# Patient Record
Sex: MALE | Race: WHITE | NOT HISPANIC OR LATINO | URBAN - METROPOLITAN AREA
[De-identification: names, ages, dates, MRNs, and addresses within clinical notes are randomized per-mention and may not be internally consistent; named-entity substitution may affect disease eponyms.]

---

## 2020-06-23 ENCOUNTER — OFFICE VISIT (OUTPATIENT)
Dept: CARDIOTHORACIC SURGERY | Facility: CLINIC | Age: 55
End: 2020-06-23
Payer: COMMERCIAL

## 2020-06-23 VITALS
DIASTOLIC BLOOD PRESSURE: 86 MMHG | WEIGHT: 200.5 LBS | HEART RATE: 63 BPM | OXYGEN SATURATION: 99 % | SYSTOLIC BLOOD PRESSURE: 136 MMHG | BODY MASS INDEX: 32.22 KG/M2 | HEIGHT: 66 IN

## 2020-06-23 DIAGNOSIS — I34.0 MITRAL VALVE INSUFFICIENCY, UNSPECIFIED ETIOLOGY: Primary | ICD-10-CM

## 2020-06-23 DIAGNOSIS — I34.0 NONRHEUMATIC MITRAL VALVE REGURGITATION: ICD-10-CM

## 2020-06-23 PROCEDURE — 99205 OFFICE O/P NEW HI 60 MIN: CPT | Mod: S$GLB,,, | Performed by: THORACIC SURGERY (CARDIOTHORACIC VASCULAR SURGERY)

## 2020-06-23 PROCEDURE — 99999 PR PBB SHADOW E&M-EST. PATIENT-LVL III: ICD-10-PCS | Mod: PBBFAC,,, | Performed by: THORACIC SURGERY (CARDIOTHORACIC VASCULAR SURGERY)

## 2020-06-23 PROCEDURE — 3008F PR BODY MASS INDEX (BMI) DOCUMENTED: ICD-10-PCS | Mod: CPTII,S$GLB,, | Performed by: THORACIC SURGERY (CARDIOTHORACIC VASCULAR SURGERY)

## 2020-06-23 PROCEDURE — 99205 PR OFFICE/OUTPT VISIT, NEW, LEVL V, 60-74 MIN: ICD-10-PCS | Mod: S$GLB,,, | Performed by: THORACIC SURGERY (CARDIOTHORACIC VASCULAR SURGERY)

## 2020-06-23 PROCEDURE — 3008F BODY MASS INDEX DOCD: CPT | Mod: CPTII,S$GLB,, | Performed by: THORACIC SURGERY (CARDIOTHORACIC VASCULAR SURGERY)

## 2020-06-23 PROCEDURE — 99999 PR PBB SHADOW E&M-EST. PATIENT-LVL III: CPT | Mod: PBBFAC,,, | Performed by: THORACIC SURGERY (CARDIOTHORACIC VASCULAR SURGERY)

## 2020-06-23 RX ORDER — METOPROLOL TARTRATE 25 MG/1
25 TABLET, FILM COATED ORAL 2 TIMES DAILY
Status: ON HOLD | COMMUNITY
Start: 2020-04-15 | End: 2020-07-28 | Stop reason: HOSPADM

## 2020-06-23 RX ORDER — LOSARTAN POTASSIUM 100 MG/1
TABLET ORAL
Status: ON HOLD | COMMUNITY
Start: 2020-06-20 | End: 2020-07-28 | Stop reason: HOSPADM

## 2020-06-23 NOTE — PROGRESS NOTES
Subjective:      Patient ID: Kamari Merino is a 55 y.o. male.    Chief Complaint: Consult      HPI:  Kamari Merino is a 55 y.o. male with a medical history of hypertension who presents to the CTS clinic for evaluation of severe mitral regurgitation.  Patient reports that the PCP was having problems regulating his blood pressure on medication and sent him to see a cardiologist who diagnosised him with MR on echo.  Clinical symptoms include fatigue, shortness of breath, and palpations.       Family and social history reviewed    Review of patient's allergies indicates:  No Known Allergies  Past Medical History:   Diagnosis Date    Hypertension      History reviewed. No pertinent surgical history.  Family History     None        Social History     Socioeconomic History    Marital status:      Spouse name: Not on file    Number of children: Not on file    Years of education: Not on file    Highest education level: Not on file   Occupational History    Not on file   Social Needs    Financial resource strain: Not on file    Food insecurity     Worry: Not on file     Inability: Not on file    Transportation needs     Medical: Not on file     Non-medical: Not on file   Tobacco Use    Smoking status: Never Smoker   Substance and Sexual Activity    Alcohol use: Not on file    Drug use: Not on file    Sexual activity: Not on file   Lifestyle    Physical activity     Days per week: Not on file     Minutes per session: Not on file    Stress: Not on file   Relationships    Social connections     Talks on phone: Not on file     Gets together: Not on file     Attends [employer] service: Not on file     Active member of club or organization: Not on file     Attends meetings of clubs or organizations: Not on file     Relationship status: Not on file   Other Topics Concern    Not on file   Social History Narrative    Not on file       Current medications Reviewed    Review of Systems   Constitutional: Positive for  appetite change and fatigue. Negative for activity change and fever.   HENT: Negative for nosebleeds.    Respiratory: Positive for shortness of breath. Negative for cough.    Cardiovascular: Negative for chest pain, palpitations and leg swelling.        Palpitations    Gastrointestinal: Negative for abdominal distention, abdominal pain and nausea.   Genitourinary: Negative for frequency.   Musculoskeletal: Negative for arthralgias and myalgias.   Skin: Negative for rash.   Neurological: Negative for dizziness and numbness.   Hematological: Does not bruise/bleed easily.     Objective:   Physical Exam  Constitutional:       Appearance: He is well-developed.   HENT:      Head: Normocephalic and atraumatic.   Eyes:      Extraocular Movements: Extraocular movements intact.   Neck:      Musculoskeletal: Normal range of motion.   Cardiovascular:      Rate and Rhythm: Normal rate and regular rhythm.      Heart sounds: Normal heart sounds.   Pulmonary:      Effort: Pulmonary effort is normal.      Breath sounds: Normal breath sounds.   Abdominal:      Palpations: Abdomen is soft.   Musculoskeletal: Normal range of motion.   Skin:     General: Skin is warm and dry.      Capillary Refill: Capillary refill takes less than 2 seconds.   Neurological:      Mental Status: He is alert and oriented to person, place, and time.         Diagnostic Results:   ECHO 5/22/2020  Normal left ventricular size and function  Myxomatous degeneration of mitral valve  Severe prolapse of both mitral leaflets of the mitral valve  Severe mitral regurgitation   4 distinct jets of MR detected  Normal left atrium  No evidence of patient foramen ovale by bubble study    All diagnotics and labs reviewed.    Assessment:   1. Severe mitral regurgitation  Plan:     CTS Attending Note:    I have personally taken the history and examined this patient and agree with the FAISAL's note as stated above.  Very pleasant 55-year-old gentleman who initially presented with  fatigue and decreased stamina.  He had a thoughtful and thorough evaluation which included echocardiography.  This study demonstrated severe mitral regurgitation.  I discussed his situation with him and his wife in detail, and I recommended mitral valve repair.  We discussed the risks and benefits of the proposed procedure, including but not limited to death, stroke, respiratory complications, renal complications, arrythmia, need for permanent pacemaker, and infection. His questions have been answered, and he wishes to proceed.  We discussed the advantages and disadvantages of tissue and mechanical prostheses should repair not be feasible.  In that instance, he indicated that he desires a mechanical prosthesis.  Lastly, I recommended preoperative coronary angiography.  He will contact Dr. Osuna's office to see if this can be done in [place].  They may be closed for out patients due to COVID.  If that is the case, then we will make arrangements for him to have an angiogram here.

## 2020-06-23 NOTE — LETTER
[facility]  [address]  [facility] [address]  Phone: [phone] June 23, 2020      Leonidas Osuna MD  [address] [facility] [place] [address]    Patient: Kamari Merino   MR Number: [id number]   YOB: 1965   Date of Visit: 6/23/2020     Dear Dr. Osuna,     I had the pleasure seeing your patient Mr. Lg Merino in clinic today.  As you know, he is a very pleasant 55-year-old gentleman who initially presented with fatigue.  He underwent a thoughtful and thorough evaluation which included echocardiography.  The study demonstrated severe mitral regurgitation.  I discussed Mr. Merino's situation with him and his wife in detail.  I recommended mitral valve repair, and he agreed with this.  We will plan to get this done for him sometime in the near future.  As part of his preoperative preparation, I recommended coronary angiography.  They are going to reach out to your office to make those arrangements.  Thank you again for sending this pleasant gentleman to me.  When he does come to surgery, I will certainly keep you appraised of his progress.    Sincerely,        Cornel Guzman MD  Chief, Division of Thoracic & Cardiovascular Surgery  [facility]    PEP/afw

## 2020-06-25 ENCOUNTER — PATIENT MESSAGE (OUTPATIENT)
Dept: CARDIOTHORACIC SURGERY | Facility: CLINIC | Age: 55
End: 2020-06-25

## 2020-07-21 ENCOUNTER — LAB VISIT (OUTPATIENT)
Dept: FAMILY MEDICINE | Facility: CLINIC | Age: 55
End: 2020-07-21
Payer: COMMERCIAL

## 2020-07-21 PROCEDURE — U0003 INFECTIOUS AGENT DETECTION BY NUCLEIC ACID (DNA OR RNA); SEVERE ACUTE RESPIRATORY SYNDROME CORONAVIRUS 2 (SARS-COV-2) (CORONAVIRUS DISEASE [COVID-19]), AMPLIFIED PROBE TECHNIQUE, MAKING USE OF HIGH THROUGHPUT TECHNOLOGIES AS DESCRIBED BY CMS-2020-01-R: HCPCS

## 2020-07-22 LAB — SARS-COV-2 RNA RESP QL NAA+PROBE: NOT DETECTED

## 2020-07-23 ENCOUNTER — HOSPITAL ENCOUNTER (OUTPATIENT)
Dept: RADIOLOGY | Facility: HOSPITAL | Age: 55
Discharge: HOME | DRG: 221 | End: 2020-07-23
Attending: THORACIC SURGERY (CARDIOTHORACIC VASCULAR SURGERY)
Payer: COMMERCIAL

## 2020-07-23 ENCOUNTER — HOSPITAL ENCOUNTER (OUTPATIENT)
Dept: PREADMISSION TESTING | Facility: HOSPITAL | Age: 55
Discharge: HOME | DRG: 221 | End: 2020-07-23
Attending: ANESTHESIOLOGY
Payer: COMMERCIAL

## 2020-07-23 ENCOUNTER — HOSPITAL ENCOUNTER (OUTPATIENT)
Dept: PULMONOLOGY | Facility: CLINIC | Age: 55
Discharge: HOME | DRG: 221 | End: 2020-07-23
Payer: COMMERCIAL

## 2020-07-23 ENCOUNTER — ANESTHESIA EVENT (OUTPATIENT)
Dept: SURGERY | Facility: HOSPITAL | Age: 55
DRG: 221 | End: 2020-07-23
Payer: COMMERCIAL

## 2020-07-23 ENCOUNTER — HOSPITAL ENCOUNTER (OUTPATIENT)
Dept: VASCULAR SURGERY | Facility: CLINIC | Age: 55
Discharge: HOME | DRG: 221 | End: 2020-07-23
Attending: THORACIC SURGERY (CARDIOTHORACIC VASCULAR SURGERY)
Payer: COMMERCIAL

## 2020-07-23 ENCOUNTER — OFFICE VISIT (OUTPATIENT)
Dept: CARDIOTHORACIC SURGERY | Facility: CLINIC | Age: 55
DRG: 221 | End: 2020-07-23
Payer: COMMERCIAL

## 2020-07-23 ENCOUNTER — HOSPITAL ENCOUNTER (OUTPATIENT)
Dept: CARDIOLOGY | Facility: CLINIC | Age: 55
Discharge: HOME | DRG: 221 | End: 2020-07-23
Payer: COMMERCIAL

## 2020-07-23 VITALS
HEART RATE: 71 BPM | BODY MASS INDEX: 32.14 KG/M2 | HEIGHT: 66 IN | SYSTOLIC BLOOD PRESSURE: 128 MMHG | RESPIRATION RATE: 16 BRPM | WEIGHT: 200 LBS | OXYGEN SATURATION: 98 % | DIASTOLIC BLOOD PRESSURE: 80 MMHG | TEMPERATURE: 98 F

## 2020-07-23 VITALS
BODY MASS INDEX: 32.08 KG/M2 | OXYGEN SATURATION: 100 % | TEMPERATURE: 99 F | HEART RATE: 64 BPM | WEIGHT: 199.63 LBS | HEIGHT: 66 IN | SYSTOLIC BLOOD PRESSURE: 129 MMHG | DIASTOLIC BLOOD PRESSURE: 88 MMHG

## 2020-07-23 DIAGNOSIS — I34.0 MITRAL VALVE INSUFFICIENCY, UNSPECIFIED ETIOLOGY: ICD-10-CM

## 2020-07-23 DIAGNOSIS — I34.0 MITRAL REGURGITATION: ICD-10-CM

## 2020-07-23 DIAGNOSIS — I34.0 MITRAL VALVE INSUFFICIENCY, UNSPECIFIED ETIOLOGY: Primary | ICD-10-CM

## 2020-07-23 DIAGNOSIS — Z01.818 PRE-OP EXAM: ICD-10-CM

## 2020-07-23 LAB
DLCO ADJ PRE: 26.62 ML/(MIN*MMHG) (ref 20.16–34.01)
DLCO SINGLE BREATH LLN: 20.16
DLCO SINGLE BREATH PRE REF: 98.3 %
DLCO SINGLE BREATH REF: 27.09
DLCOC SBVA LLN: 2.93
DLCOC SBVA PRE REF: 95.5 %
DLCOC SBVA REF: 4.27
DLCOC SINGLE BREATH LLN: 20.16
DLCOC SINGLE BREATH PRE REF: 98.3 %
DLCOC SINGLE BREATH REF: 27.09
DLCOCSBVAULN: 5.61
DLCOCSINGLEBREATHULN: 34.01
DLCOSINGLEBREATHULN: 34.01
DLCOVA LLN: 2.93
DLCOVA PRE REF: 95.5 %
DLCOVA PRE: 4.08 ML/(MIN*MMHG*L) (ref 2.93–5.61)
DLCOVA REF: 4.27
DLCOVAULN: 5.61
DLVAADJ PRE: 4.08 ML/(MIN*MMHG*L) (ref 2.93–5.61)
FEF 25 75 LLN: 1.58
FEF 25 75 PRE REF: 123 %
FEF 25 75 REF: 3.03
FEV05 LLN: 1.42
FEV05 REF: 2.55
FEV1 FVC LLN: 68
FEV1 FVC PRE REF: 103 %
FEV1 FVC REF: 79
FEV1 LLN: 2.57
FEV1 PRE REF: 114.7 %
FEV1 REF: 3.34
FVC LLN: 3.27
FVC PRE REF: 111.3 %
FVC REF: 4.23
IVC PRE: 4.79 L (ref 3.27–5.19)
IVC SINGLE BREATH LLN: 3.27
IVC SINGLE BREATH PRE REF: 113.2 %
IVC SINGLE BREATH REF: 4.23
IVCSINGLEBREATHULN: 5.19
MVV LLN: 108
MVV PRE REF: 98.6 %
MVV REF: 127
PEF LLN: 6.7
PEF PRE REF: 127.5 %
PEF REF: 8.77
PHYSICIAN COMMENT: ABNORMAL
PRE DLCO: 26.62 ML/(MIN*MMHG) (ref 20.16–34.01)
PRE FEF 25 75: 3.73 L/S (ref 1.58–4.48)
PRE FET 100: 6.63 SEC
PRE FEV05 REF: 119.3 %
PRE FEV1 FVC: 81.5 % (ref 67.68–90.55)
PRE FEV1: 3.84 L (ref 2.57–4.11)
PRE FEV5: 3.04 L (ref 1.42–3.69)
PRE FVC: 4.71 L (ref 3.27–5.19)
PRE MVV: 125 L/MIN (ref 107.71–145.73)
PRE PEF: 11.18 L/S (ref 6.7–10.84)
VA PRE: 6.53 L (ref 6.19–6.19)
VA SINGLE BREATH LLN: 6.19
VA SINGLE BREATH PRE REF: 105.4 %
VA SINGLE BREATH REF: 6.19
VASINGLEBREATHULN: 6.19

## 2020-07-23 PROCEDURE — 93880 PR DUPLEX SCAN EXTRACRANIAL,BILAT: ICD-10-PCS | Mod: S$GLB,,, | Performed by: SURGERY

## 2020-07-23 PROCEDURE — 94010 BREATHING CAPACITY TEST: ICD-10-PCS | Mod: S$GLB,,, | Performed by: INTERNAL MEDICINE

## 2020-07-23 PROCEDURE — 99999 PR PBB SHADOW E&M-EST. PATIENT-LVL IV: CPT | Mod: PBBFAC,,, | Performed by: THORACIC SURGERY (CARDIOTHORACIC VASCULAR SURGERY)

## 2020-07-23 PROCEDURE — 93010 EKG 12-LEAD: ICD-10-PCS | Mod: S$GLB,,, | Performed by: INTERNAL MEDICINE

## 2020-07-23 PROCEDURE — 99499 NO LOS: ICD-10-PCS | Mod: S$GLB,,, | Performed by: THORACIC SURGERY (CARDIOTHORACIC VASCULAR SURGERY)

## 2020-07-23 PROCEDURE — 99999 PR PBB SHADOW E&M-EST. PATIENT-LVL IV: ICD-10-PCS | Mod: PBBFAC,,, | Performed by: THORACIC SURGERY (CARDIOTHORACIC VASCULAR SURGERY)

## 2020-07-23 PROCEDURE — 94010 BREATHING CAPACITY TEST: CPT | Mod: S$GLB,,, | Performed by: INTERNAL MEDICINE

## 2020-07-23 PROCEDURE — 94729 PR C02/MEMBANE DIFFUSE CAPACITY: ICD-10-PCS | Mod: S$GLB,,, | Performed by: INTERNAL MEDICINE

## 2020-07-23 PROCEDURE — 94727 GAS DIL/WSHOT DETER LNG VOL: CPT | Mod: S$GLB,,, | Performed by: INTERNAL MEDICINE

## 2020-07-23 PROCEDURE — 99499 UNLISTED E&M SERVICE: CPT | Mod: S$GLB,,, | Performed by: THORACIC SURGERY (CARDIOTHORACIC VASCULAR SURGERY)

## 2020-07-23 PROCEDURE — 71046 X-RAY EXAM CHEST 2 VIEWS: CPT | Mod: TC,FY

## 2020-07-23 PROCEDURE — 93880 EXTRACRANIAL BILAT STUDY: CPT | Mod: S$GLB,,, | Performed by: SURGERY

## 2020-07-23 PROCEDURE — 94727 PR PULM FUNCTION TEST BY GAS: ICD-10-PCS | Mod: S$GLB,,, | Performed by: INTERNAL MEDICINE

## 2020-07-23 PROCEDURE — 93005 ELECTROCARDIOGRAM TRACING: CPT | Mod: S$GLB,,, | Performed by: THORACIC SURGERY (CARDIOTHORACIC VASCULAR SURGERY)

## 2020-07-23 PROCEDURE — 93005 EKG 12-LEAD: ICD-10-PCS | Mod: S$GLB,,, | Performed by: THORACIC SURGERY (CARDIOTHORACIC VASCULAR SURGERY)

## 2020-07-23 PROCEDURE — 71046 X-RAY EXAM CHEST 2 VIEWS: CPT | Mod: 26,,, | Performed by: RADIOLOGY

## 2020-07-23 PROCEDURE — 94729 DIFFUSING CAPACITY: CPT | Mod: S$GLB,,, | Performed by: INTERNAL MEDICINE

## 2020-07-23 PROCEDURE — 93010 ELECTROCARDIOGRAM REPORT: CPT | Mod: S$GLB,,, | Performed by: INTERNAL MEDICINE

## 2020-07-23 PROCEDURE — 71046 XR CHEST PA AND LATERAL PRE-OP: ICD-10-PCS | Mod: 26,,, | Performed by: RADIOLOGY

## 2020-07-23 RX ORDER — PROPOFOL 10 MG/ML
5 INJECTION, EMULSION INTRAVENOUS CONTINUOUS
Status: CANCELLED | OUTPATIENT
Start: 2020-07-23

## 2020-07-23 RX ORDER — MUPIROCIN 20 MG/G
1 OINTMENT TOPICAL 2 TIMES DAILY
Status: CANCELLED | OUTPATIENT
Start: 2020-07-23 | End: 2020-07-28

## 2020-07-23 RX ORDER — POLYETHYLENE GLYCOL 3350 17 G/17G
17 POWDER, FOR SOLUTION ORAL DAILY
Status: CANCELLED | OUTPATIENT
Start: 2020-07-24

## 2020-07-23 RX ORDER — POTASSIUM CHLORIDE 29.8 MG/ML
40 INJECTION INTRAVENOUS
Status: CANCELLED | OUTPATIENT
Start: 2020-07-23

## 2020-07-23 RX ORDER — IPRATROPIUM BROMIDE AND ALBUTEROL SULFATE 2.5; .5 MG/3ML; MG/3ML
3 SOLUTION RESPIRATORY (INHALATION) EVERY 4 HOURS
Status: CANCELLED | OUTPATIENT
Start: 2020-07-23 | End: 2020-07-24

## 2020-07-23 RX ORDER — IPRATROPIUM BROMIDE AND ALBUTEROL SULFATE 2.5; .5 MG/3ML; MG/3ML
3 SOLUTION RESPIRATORY (INHALATION) EVERY 4 HOURS PRN
Status: CANCELLED | OUTPATIENT
Start: 2020-07-23 | End: 2020-07-24

## 2020-07-23 RX ORDER — DOCUSATE SODIUM 100 MG/1
100 CAPSULE, LIQUID FILLED ORAL 2 TIMES DAILY
Status: CANCELLED | OUTPATIENT
Start: 2020-07-23

## 2020-07-23 RX ORDER — ASPIRIN 325 MG
325 TABLET, DELAYED RELEASE (ENTERIC COATED) ORAL DAILY
Status: CANCELLED | OUTPATIENT
Start: 2020-07-24

## 2020-07-23 RX ORDER — PANTOPRAZOLE SODIUM 40 MG/1
40 TABLET, DELAYED RELEASE ORAL
Status: CANCELLED | OUTPATIENT
Start: 2020-07-24

## 2020-07-23 RX ORDER — ASPIRIN 325 MG
325 TABLET ORAL ONCE
Status: CANCELLED | OUTPATIENT
Start: 2020-07-23 | End: 2020-07-23

## 2020-07-23 RX ORDER — ASPIRIN 325 MG
325 TABLET ORAL DAILY
Status: CANCELLED | OUTPATIENT
Start: 2020-07-24

## 2020-07-23 RX ORDER — POTASSIUM CHLORIDE 750 MG/1
20 TABLET, EXTENDED RELEASE ORAL EVERY 12 HOURS
Status: CANCELLED | OUTPATIENT
Start: 2020-07-23

## 2020-07-23 RX ORDER — LIDOCAINE HYDROCHLORIDE 10 MG/ML
1 INJECTION, SOLUTION EPIDURAL; INFILTRATION; INTRACAUDAL; PERINEURAL
Status: CANCELLED | OUTPATIENT
Start: 2020-07-23

## 2020-07-23 RX ORDER — METOPROLOL TARTRATE 25 MG/1
25 TABLET ORAL
Status: CANCELLED | OUTPATIENT
Start: 2020-07-23

## 2020-07-23 RX ORDER — FENTANYL CITRATE 50 UG/ML
25 INJECTION, SOLUTION INTRAMUSCULAR; INTRAVENOUS
Status: CANCELLED | OUTPATIENT
Start: 2020-07-23

## 2020-07-23 RX ORDER — SODIUM CHLORIDE 9 MG/ML
INJECTION, SOLUTION INTRAVENOUS CONTINUOUS
Status: CANCELLED | OUTPATIENT
Start: 2020-07-23

## 2020-07-23 RX ORDER — ASPIRIN 300 MG/1
300 SUPPOSITORY RECTAL ONCE AS NEEDED
Status: CANCELLED | OUTPATIENT
Start: 2020-07-23 | End: 2031-12-20

## 2020-07-23 RX ORDER — DEXTROSE MONOHYDRATE, SODIUM CHLORIDE, AND POTASSIUM CHLORIDE 50; 1.49; 4.5 G/1000ML; G/1000ML; G/1000ML
INJECTION, SOLUTION INTRAVENOUS CONTINUOUS
Status: CANCELLED | OUTPATIENT
Start: 2020-07-23

## 2020-07-23 RX ORDER — FENTANYL CITRATE 50 UG/ML
50 INJECTION, SOLUTION INTRAMUSCULAR; INTRAVENOUS
Status: CANCELLED | OUTPATIENT
Start: 2020-07-25

## 2020-07-23 RX ORDER — ALBUMIN HUMAN 50 G/1000ML
500 SOLUTION INTRAVENOUS ONCE AS NEEDED
Status: CANCELLED | OUTPATIENT
Start: 2020-07-23 | End: 2031-12-20

## 2020-07-23 RX ORDER — POTASSIUM CHLORIDE 14.9 MG/ML
20 INJECTION INTRAVENOUS
Status: CANCELLED | OUTPATIENT
Start: 2020-07-23

## 2020-07-23 RX ORDER — FENTANYL CITRATE 50 UG/ML
25 INJECTION, SOLUTION INTRAMUSCULAR; INTRAVENOUS
Status: CANCELLED | OUTPATIENT
Start: 2020-07-23 | End: 2020-07-24

## 2020-07-23 RX ORDER — MUPIROCIN 20 MG/G
1 OINTMENT TOPICAL
Status: CANCELLED | OUTPATIENT
Start: 2020-07-23

## 2020-07-23 RX ORDER — PANTOPRAZOLE SODIUM 40 MG/10ML
40 INJECTION, POWDER, LYOPHILIZED, FOR SOLUTION INTRAVENOUS DAILY
Status: CANCELLED | OUTPATIENT
Start: 2020-07-24

## 2020-07-23 RX ORDER — ATORVASTATIN CALCIUM 10 MG/1
40 TABLET, FILM COATED ORAL NIGHTLY
Status: CANCELLED | OUTPATIENT
Start: 2020-07-23

## 2020-07-23 RX ORDER — OXYCODONE HYDROCHLORIDE 5 MG/1
10 TABLET ORAL EVERY 4 HOURS PRN
Status: CANCELLED | OUTPATIENT
Start: 2020-07-23

## 2020-07-23 RX ORDER — ONDANSETRON 2 MG/ML
4 INJECTION INTRAMUSCULAR; INTRAVENOUS EVERY 12 HOURS PRN
Status: CANCELLED | OUTPATIENT
Start: 2020-07-23

## 2020-07-23 RX ORDER — OXYCODONE HYDROCHLORIDE 5 MG/1
5 TABLET ORAL EVERY 4 HOURS PRN
Status: CANCELLED | OUTPATIENT
Start: 2020-07-23

## 2020-07-23 RX ORDER — POTASSIUM CHLORIDE 14.9 MG/ML
60 INJECTION INTRAVENOUS
Status: CANCELLED | OUTPATIENT
Start: 2020-07-23

## 2020-07-23 RX ORDER — ACETAMINOPHEN 325 MG/1
650 TABLET ORAL EVERY 4 HOURS PRN
Status: CANCELLED | OUTPATIENT
Start: 2020-07-23

## 2020-07-23 RX ORDER — SODIUM CHLORIDE 0.9 % (FLUSH) 0.9 %
10 SYRINGE (ML) INJECTION
Status: CANCELLED | OUTPATIENT
Start: 2020-07-23

## 2020-07-23 RX ORDER — BISACODYL 10 MG
10 SUPPOSITORY, RECTAL RECTAL EVERY 12 HOURS PRN
Status: CANCELLED | OUTPATIENT
Start: 2020-07-23

## 2020-07-23 RX ORDER — METOCLOPRAMIDE HYDROCHLORIDE 5 MG/ML
5 INJECTION INTRAMUSCULAR; INTRAVENOUS EVERY 6 HOURS PRN
Status: CANCELLED | OUTPATIENT
Start: 2020-07-23

## 2020-07-23 NOTE — H&P (VIEW-ONLY)
Subjective:      Patient ID: Kamari Merino is a 55 y.o. male.    Chief Complaint: Pre-op Exam      HPI:  Kamari Merino is a 55 y.o. male with a medical history of hypertension who presents to the CTS clinic for evaluation of severe mitral regurgitation.  Patient reports that the PCP was having problems regulating his blood pressure on medication and sent him to see a cardiologist who diagnosised him with MR on echo.  Clinical symptoms include fatigue, shortness of breath, and palpations. The patient presents to [facility] today to pre-op for       Current Outpatient Medications:     metoprolol tartrate (LOPRESSOR) 25 MG tablet, 25 mg 2 (two) times daily. , Disp: , Rfl:     aspirin-calcium carbonate 81 mg-300 mg calcium(777 mg) Tab,  = 1 tab, Oral, Daily, # 30 tab, 0 Refill(s), Disp: , Rfl:     losartan (COZAAR) 100 MG tablet, , Disp: , Rfl:     multivit with minerals/lutein (MULTIVITAMIN 50 PLUS ORAL),  See Instructions, Take 1 po Daily, 0 Refill(s), Disp: , Rfl:     OMEGA-3-DHA-EPA-DPA-FISH OIL ORAL,  1 cap, Oral, Daily, # 100 cap, 0 Refill(s), Disp: , Rfl:   Current medications Reviewed    Review of Systems   Constitutional: Positive for appetite change and fatigue. Negative for activity change and fever.   HENT: Negative for nosebleeds.    Respiratory: Positive for shortness of breath. Negative for cough.    Cardiovascular: Positive for palpitations. Negative for chest pain and leg swelling.   Gastrointestinal: Negative for abdominal distention, abdominal pain and nausea.   Genitourinary: Negative for frequency.   Musculoskeletal: Negative for arthralgias and myalgias.   Skin: Negative for rash.   Neurological: Negative for dizziness and numbness.   Hematological: Does not bruise/bleed easily.     Objective:   Physical Exam  Constitutional:       Appearance: He is well-developed.   HENT:      Head: Normocephalic and atraumatic.   Neck:      Musculoskeletal: Normal range of motion.   Cardiovascular:      Rate and  Rhythm: Normal rate and regular rhythm.      Heart sounds: Normal heart sounds.   Pulmonary:      Effort: Pulmonary effort is normal.      Breath sounds: Normal breath sounds.   Abdominal:      Palpations: Abdomen is soft.   Musculoskeletal: Normal range of motion.   Skin:     General: Skin is warm and dry.      Capillary Refill: Capillary refill takes less than 2 seconds.   Neurological:      Mental Status: He is alert and oriented to person, place, and time.         Diagnotic Results:  Carotid Ultrasound bilateral  Normal, no stenosis  [id number]  ECHO 5/22/2020  Normal left ventricular size and function  Myxomatous degeneration of mitral valve  Severe prolapse of both mitral leaflets of the mitral valve  Severe mitral regurgitation   4 distinct jets of MR detected  Normal left atrium  No evidence of patient foramen ovale by bubble study     All diagnotics and labs reviewed.  Assessment:   1. Mitral Regurgitation  Plan:

## 2020-07-23 NOTE — DISCHARGE INSTRUCTIONS
Your surgery has been scheduled for:__________________________________________    You should report to:  ____[facility], located on the [place] side of the first floor of the           [facility] ([phone])  ____[facility], located on the [facility] side of the            Second floor of the [facility] ([phone])  ____[facility] ([facility]) Located at [address]  Please Note   - Tell your doctor if you take Aspirin, products containing Aspirin, herbal medications  or blood thinners, such as Coumadin, Ticlid, or Plavix.  (Consult your provider regarding holding or stopping before surgery).  - Arrange for someone to drive you home following surgery.  You will not be allowed to leave the surgical facility alone or drive yourself home following sedation and anesthesia.  Before Surgery  - Stop taking all herbal medications 14days prior to surgery  - No Motrin/Advil (Ibuprofen) 7 days before surgery  - No Aleve (Naproxen) 7 days before surgery  - Stop Taking Aspirin, products containing Aspirin _____days before surgery  - Stop taking blood thinners_______days before surgery  - No Goody's/BC  Powder 7 days before surgery  - Refrain from drinking alcoholic beverages for 24hours before and after surgery  - Stop or limit smoking _________days before surgery  - You may take Tylenol for pain  Night before Surgery   Stop ALL solid food, gum, candy (including vitamins) 8 hours before arrival time.  (Please note: If your surgeon gives you different eating and drinking instructions, please follow surgeon's directions.)   Stop all CLOUDY liquids: coffee with creamer, formula, tube feeds, cloudy juices, non-human milk and breast milk with additives, 6 hours prior to arrival time.   Stop plain breast milk 4 hours prior to arrival time.   The patient should be ENCOURAGED to drink carbohydrate-rich clear liquids (sports  drinks, clear juices) until 2 hours prior to arrival time.   CLEAR liquids include only water, black coffee NO creamer, clear oral rehydration drinks, clear sports drinks or clear fruit juices (no orange juice, no pulpy juices, no apple cider). Advise patients if they can read newsprint through the liquid, it qualifies as clear liquid.    IF IN DOUBT, drink water instead.   - Take a shower or bath (shower is recommended).  Bathe with Hibiclens soap or an antibacterial soap from the neck down.  If not supplied by your surgeon, hibiclens soap will need to be purchased over the counter in pharmacy.  Rinse soap off thoroughly.  - Shampoo your hair with your regular shampoo  The Day of Surgery  · NOTHING TO  DRINK 2 hours before arrival time. If you are told to take medication on the morning of surgery, it may be taken with a sip of water.   - Take another bath or shower with hibiclens or any antibacterial soap, to reduce the chance of infection.  - Take heart and blood pressure medications with a small sip of water, as advised by the perioperative team.  - Do not take fluid pills  - You may brush your teeth and rinse your mouth, but do not swallow any additional water.   - Do not apply perfumes, powder, body lotions or deodorant on the day of surgery.  - Nail polish should be removed.  - Do not wear makeup or moisturizer  - Wear comfortable clothes, such as a button front shirt and loose fitting pants.  - Leave all jewelry, including body piercings, and valuables at home.    - Bring any devices you will neeed after surgery such as crutches or canes.  - If you have sleep apnea, please bring your CPAP machine  In the event that your physical condition changes including the onset of a cold or respiratory illness, or if you have to delay or cancel your surgery, please notify your surgeon.    Anesthesia: General Anesthesia     You are watched continuously during your procedure by your anesthesia provider.     Youre due to  have surgery. During surgery, youll be given medicine called anesthesia or anesthetic. This will keep you comfortable and pain-free. Your anesthesia provider will use general anesthesia.  What is general anesthesia?  General anesthesia puts you into a state like deep sleep. It goes into the bloodstream (IV anesthetics), into the lungs (gas anesthetics), or both. You feel nothing during the procedure. You will not remember it. During the procedure, the anesthesia provider monitors you continuously. He or she checks your heart rate and rhythm, blood pressure, breathing, and blood oxygen.  · IV anesthetics. IV anesthetics are given through an IV line in your arm. Theyre often given first. This is so you are asleep before a gas anesthetic is started. Some kinds of IV anesthetics relieve pain. Others relax you. Your doctor will decide which kind is best in your case.  · Gas anesthetics. Gas anesthetics are breathed into the lungs. They are often used to keep you asleep. They can be given through a facemask or a tube placed in your larynx or trachea (breathing tube).  ? If you have a facemask, your anesthesia provider will most likely place it over your nose and mouth while youre still awake. Youll breathe oxygen through the mask as your IV anesthetic is started. Gas anesthetic may be added through the mask.  ? If you have a tube in the larynx or trachea, it will be inserted into your throat after youre asleep.  Anesthesia tools and medicines  You will likely have:  · IV anesthetics. These are put into an IV line into your bloodstream.  · Gas anesthetics. You breathe these anesthetics into your lungs, where they pass into your bloodstream.  · Pulse oximeter. This is a small clip that is attached to the end of your finger. This measures your blood oxygen level.  · Electrocardiography leads (electrodes). These are small sticky pads that are placed on your chest. They record your heart rate and rhythm.  · Blood pressure  cuff. This reads your blood pressure.  Risks and possible complications  General anesthesia has some risks. These include:  · Breathing problems  · Nausea and vomiting  · Sore throat or hoarseness (usually temporary)  · Allergic reaction to the anesthetic  · Irregular heartbeat (rare)  · Cardiac arrest (rare)   Anesthesia safety  · Follow all instructions you are given for how long not to eat or drink before your procedure.  · Be sure your doctor knows what medicines and drugs you take. This includes over-the-counter medicines, herbs, supplements, alcohol or other drugs. You will be asked when those were last taken.  · Have an adult family member or friend drive you home after the procedure.  · For the first 24 hours after your surgery:  ? Do not drive or use heavy equipment.  ? Do not make important decisions or sign legal documents. If important decisions or signing legal documents is necessary during the first 24 hours after surgery, have a trusted family member or spouse act on your behalf.  ? Avoid alcohol.  ? Have a responsible adult stay with you. He or she can watch for problems and help keep you safe.  Date Last Reviewed: 12/1/2016 © 2000-2017 [employer]. [address] [place] [address]. All rights reserved. This information is not intended as a substitute for professional medical care. Always follow your healthcare professional's instructions

## 2020-07-23 NOTE — ANESTHESIA PAT ROS NOTE
07/23/2020  Kamari Merino is a 55 y.o., male.      Pre-op Assessment          Review of Systems  Anesthesia Hx:  No problems with previous Anesthesia  Denies Family Hx of Anesthesia complications.   Denies Personal Hx of Anesthesia complications.   Social:  Social Alcohol Use, Non-Smoker    Hematology/Oncology:  Hematology Normal   Oncology Normal     EENT/Dental:EENT/Dental Normal   Cardiovascular:    Denies Angina. Echo in media 6/22/20 Functional Capacity good / => 4 METS  Cardiovascular Symptoms: Shortness of Breath, increasing in frequency  Valvular Heart Disease: Mitral Regurgitation (MR), severe   Hypertension , Fairly Controlled on RX , Recent typical clinic B/P of 128/80    Pulmonary:   Denies Recent URI.  Pulmonary Symptoms:  are shortness of breath with activity.  Possible Obstructive Sleep Apnea , (STOP/BANG) Symptoms S - Snoring (loud), A - Age > 50 and P - Pressure being treated for high BP        Renal/:   renal calculi    Hepatic/GI:  Esophageal / Stomach Disorders Gerd (HX of GERD-no longer on meds after Zantac recall)    Musculoskeletal:  Musculoskeletal General/Symptoms: neck pain. Functional capacity is ambulatory without assistance.  Cervical Spine Disorder, Cervical Disc Disease, Radiculopathy    Neurological:  Neurology Normal    Endocrine:  Endocrine Normal    Psych:  Psychiatric Normal           Physical Exam  General:  Well nourished    Airway/Jaw/Neck:  Airway Findings: Mouth Opening: Normal Tongue: Normal  Jaw/Neck Findings:  Neck ROM: Normal ROM      Dental:  Dental Findings: (implants x2 back molars top and bottom) In tact   Chest/Lungs:  Chest/Lungs Findings: Clear to auscultation, Normal Respiratory Rate     Heart/Vascular:  Heart Findings: Rate: Normal        Mental Status:  Mental Status Findings:  Cooperative, Alert and Oriented       7/32/20 Lab, urine and EKG results  noted.

## 2020-07-23 NOTE — ANESTHESIA PREPROCEDURE EVALUATION
[facility]  Anesthesia Pre-Operative Evaluation         Patient Name: Kamari Merino  YOB: 1965  MRN: [id number]    SUBJECTIVE:     Pre-operative evaluation for Procedure(s) (LRB):  Mitral valve repair (N/A)  REPLACEMENT, MITRAL VALVE (N/A)     07/23/2020    Kamari Merino is a 55 y.o. male w/ a significant PMHx of HTN, mitral regurg    Patient now presents for the above procedure(s).    ECHO 6/22/2020  Normal left ventricular size and function  Myxomatous degeneration of mitral valve  Severe prolapse of both mitral leaflets of the mitral valve  Severe mitral regurgitation   4 distinct jets of MR detected  Normal left atrium  No evidence of patient foramen ovale by bubble study    LDA: None documented.    Prev airway: None documented.    Drips: None documented.    Patient Active Problem List   Diagnosis    Mitral regurgitation    Pre-op exam       Review of patient's allergies indicates:  No Known Allergies    Current Outpatient Medications:  No current facility-administered medications for this encounter.     Current Outpatient Medications:     aspirin-calcium carbonate 81 mg-300 mg calcium(777 mg) Tab,  = 1 tab, Oral, Daily, # 30 tab, 0 Refill(s), Disp: , Rfl:     losartan (COZAAR) 100 MG tablet, , Disp: , Rfl:     metoprolol tartrate (LOPRESSOR) 25 MG tablet, 25 mg 2 (two) times daily. , Disp: , Rfl:     multivit with minerals/lutein (MULTIVITAMIN 50 PLUS ORAL),  See Instructions, Take 1 po Daily, 0 Refill(s), Disp: , Rfl:     OMEGA-3-DHA-EPA-DPA-FISH OIL ORAL,  1 cap, Oral, Daily, # 100 cap, 0 Refill(s), Disp: , Rfl:     No past surgical history on file.    Social History     Socioeconomic History    Marital status:      Spouse name: Not on file    Number of children: Not on file    Years of education: Not on file    Highest education level: Not on file    Occupational History    Not on file   Social Needs    Financial resource strain: Not hard at all    Food insecurity     Worry: Never true     Inability: Never true    Transportation needs     Medical: No     Non-medical: No   Tobacco Use    Smoking status: Never Smoker   Substance and Sexual Activity    Alcohol Use     Frequency: 2-4 times a month     Drinks per session: 1 or 2     Binge frequency: Never    Drug use: Not on file    Sexual activity: Not on file   Lifestyle    Physical activity     Days per week: 5 days     Minutes per session: 30 min    Stress: Only a little   Relationships    Social connections     Talks on phone: More than three times a week     Gets together: Twice a week     Attends Yazidi service: Not on file     Active member of club or organization: Yes     Attends meetings of clubs or organizations: More than 4 times per year     Relationship status:    Other Topics Concern    Not on file   Social History Narrative    Not on file       OBJECTIVE:     Vital Signs Range (Last 24H):  Temp:  [36.9 °C (98.4 °F)-37 °C (98.6 °F)]   Pulse:  [64-71]   Resp:  [16]   BP: (128-129)/(80-88)   SpO2:  [98 %-100 %]       Significant Labs:  Lab Results   Component Value Date    WBC 5.87 07/23/2020    HGB 14.8 07/23/2020    HCT 46.0 07/23/2020     07/23/2020    ALT 35 07/23/2020    AST 22 07/23/2020     07/23/2020    K 4.3 07/23/2020     07/23/2020    CREATININE 1.1 07/23/2020    BUN 19 07/23/2020    CO2 27 07/23/2020    INR 1.0 07/23/2020    HGBA1C 5.2 07/23/2020       Diagnostic Studies: No relevant studies.    EKG:   Results for orders placed or performed during the hospital encounter of 07/23/20   EKG 12-lead    Collection Time: 07/23/20 12:20 PM    Narrative    Test Reason : I34.0,    Vent. Rate : 062 BPM     Atrial Rate : 062 BPM     P-R Int : 140 ms          QRS Dur : 074 ms      QT Int : 410 ms       P-R-T Axes : 015 002 025 degrees     QTc Int : 416  ms    Normal sinus rhythm  Normal ECG  No previous ECGs available  Confirmed by Leonidas Griffin MD ([id number]) on 7/23/2020 2:17:50 PM    Referred By: CHRISTIE CARIAS           Confirmed By:Leonidas Griffin MD       2D ECHO:  TTE:  No results found for this or any previous visit.    COLLETTE:  No results found for this or any previous visit.    ASSESSMENT/PLAN:         Anesthesia Evaluation    I have reviewed the Patient Summary Reports.   I have reviewed the NPO Status.      Review of Systems  Anesthesia Hx:  No problems with previous Anesthesia  Neg history of prior surgery. Denies Family Hx of Anesthesia complications.   Denies Personal Hx of Anesthesia complications.   Social:  Social Alcohol Use, Non-Smoker    Hematology/Oncology:  Hematology Normal   Oncology Normal     EENT/Dental:EENT/Dental Normal   Cardiovascular:   Hypertension Valvular problems/Murmurs, MR  Denies Angina. Echo in media 6/22/20 Functional Capacity good / => 4 METS  Cardiovascular Symptoms: Shortness of Breath, increasing in frequency  Valvular Heart Disease: Mitral Regurgitation (MR), severe   Hypertension , Fairly Controlled on RX , Recent typical clinic B/P of 128/80    Pulmonary:   Denies Recent URI.  Pulmonary Symptoms:  are shortness of breath with activity.  Possible Obstructive Sleep Apnea , (STOP/BANG) Symptoms S - Snoring (loud), A - Age > 50 and P - Pressure being treated for high BP        Renal/:   renal calculi    Hepatic/GI:  Esophageal / Stomach Disorders Gerd (HX of GERD-no longer on meds after Zantac recall)    Musculoskeletal:  Musculoskeletal General/Symptoms: neck pain. Functional capacity is ambulatory without assistance.  Cervical Spine Disorder, Cervical Disc Disease, Radiculopathy    Neurological:  Neurology Normal    Endocrine:  Endocrine Normal    Psych:  Psychiatric Normal           Physical Exam  General:  Well nourished    Airway/Jaw/Neck:  Airway Findings: Mouth Opening: Normal Tongue: Normal  General Airway  Assessment: Adult  Mallampati: II  TM Distance: Normal, at least 6 cm  Jaw/Neck Findings:  Neck ROM: Normal ROM      Dental:  Dental Findings: In tact   Chest/Lungs:  Chest/Lungs Findings: Clear to auscultation, Normal Respiratory Rate         Mental Status:  Mental Status Findings:  Cooperative, Alert and Oriented         Anesthesia Plan  Type of Anesthesia, risks & benefits discussed:  Anesthesia Type:  general  Patient's Preference:   Intra-op Monitoring Plan: standard ASA monitors, arterial line and central line  Intra-op Monitoring Plan Comments:   Post Op Pain Control Plan: multimodal analgesia, IV/PO Opioids PRN and per primary service following discharge from PACU  Post Op Pain Control Plan Comments:   Induction:   IV  Beta Blocker:  Patient is on a Beta-Blocker and has received one dose within the past 24 hours (No further documentation required).       Informed Consent: Patient understands risks and agrees with Anesthesia plan.  Questions answered. Anesthesia consent signed with patient.  ASA Score: 3     Day of Surgery Review of History & Physical: I have interviewed and examined the patient. I have reviewed the patient's H&P dated:  There are no significant changes.  H&P update referred to the surgeon.         Ready For Surgery From Anesthesia Perspective.

## 2020-07-23 NOTE — PROGRESS NOTES
Subjective:      Patient ID: Kamari Merino is a 55 y.o. male.    Chief Complaint: Pre-op Exam      HPI:  Kamari Merino is a 55 y.o. male with a medical history of hypertension who presents to the CTS clinic for evaluation of severe mitral regurgitation.  Patient reports that the PCP was having problems regulating his blood pressure on medication and sent him to see a cardiologist who diagnosised him with MR on echo.  Clinical symptoms include fatigue, shortness of breath, and palpations. The patient presents to [facility] today to pre-op for       Current Outpatient Medications:     metoprolol tartrate (LOPRESSOR) 25 MG tablet, 25 mg 2 (two) times daily. , Disp: , Rfl:     aspirin-calcium carbonate 81 mg-300 mg calcium(777 mg) Tab,  = 1 tab, Oral, Daily, # 30 tab, 0 Refill(s), Disp: , Rfl:     losartan (COZAAR) 100 MG tablet, , Disp: , Rfl:     multivit with minerals/lutein (MULTIVITAMIN 50 PLUS ORAL),  See Instructions, Take 1 po Daily, 0 Refill(s), Disp: , Rfl:     OMEGA-3-DHA-EPA-DPA-FISH OIL ORAL,  1 cap, Oral, Daily, # 100 cap, 0 Refill(s), Disp: , Rfl:   Current medications Reviewed    Review of Systems   Constitutional: Positive for appetite change and fatigue. Negative for activity change and fever.   HENT: Negative for nosebleeds.    Respiratory: Positive for shortness of breath. Negative for cough.    Cardiovascular: Positive for palpitations. Negative for chest pain and leg swelling.   Gastrointestinal: Negative for abdominal distention, abdominal pain and nausea.   Genitourinary: Negative for frequency.   Musculoskeletal: Negative for arthralgias and myalgias.   Skin: Negative for rash.   Neurological: Negative for dizziness and numbness.   Hematological: Does not bruise/bleed easily.     Objective:   Physical Exam  Constitutional:       Appearance: He is well-developed.   HENT:      Head: Normocephalic and atraumatic.   Neck:      Musculoskeletal: Normal range of motion.   Cardiovascular:      Rate and  Rhythm: Normal rate and regular rhythm.      Heart sounds: Normal heart sounds.   Pulmonary:      Effort: Pulmonary effort is normal.      Breath sounds: Normal breath sounds.   Abdominal:      Palpations: Abdomen is soft.   Musculoskeletal: Normal range of motion.   Skin:     General: Skin is warm and dry.      Capillary Refill: Capillary refill takes less than 2 seconds.   Neurological:      Mental Status: He is alert and oriented to person, place, and time.         Diagnotic Results:  Carotid Ultrasound bilateral  Normal, no stenosis  FEV1-115  ECHO 5/22/2020  Normal left ventricular size and function  Myxomatous degeneration of mitral valve  Severe prolapse of both mitral leaflets of the mitral valve  Severe mitral regurgitation   4 distinct jets of MR detected  Normal left atrium  No evidence of patient foramen ovale by bubble study     All diagnotics and labs reviewed.  Assessment:   1. Mitral Regurgitation  Plan:   CTS Attending Note:    I have personally taken the history and examined this patient and agree with the FAISAL's note as stated above.  Pleasant 55-year-old gentleman with severe mitral regurgitation.  We plan mitral valve repair.  He desires a mechanical prosthesis if repair is not feasible.

## 2020-07-24 ENCOUNTER — HOSPITAL ENCOUNTER (INPATIENT)
Facility: HOSPITAL | Age: 55
LOS: 4 days | Discharge: HOME | DRG: 221 | End: 2020-07-28
Attending: THORACIC SURGERY (CARDIOTHORACIC VASCULAR SURGERY) | Admitting: THORACIC SURGERY (CARDIOTHORACIC VASCULAR SURGERY)
Payer: COMMERCIAL

## 2020-07-24 ENCOUNTER — ANESTHESIA (OUTPATIENT)
Dept: SURGERY | Facility: HOSPITAL | Age: 55
DRG: 221 | End: 2020-07-24
Payer: COMMERCIAL

## 2020-07-24 DIAGNOSIS — I34.0 MITRAL VALVE REGURGITATION: ICD-10-CM

## 2020-07-24 DIAGNOSIS — Z98.890 HISTORY OF HEART SURGERY: ICD-10-CM

## 2020-07-24 DIAGNOSIS — Z98.890 S/P MITRAL VALVE REPAIR: Primary | ICD-10-CM

## 2020-07-24 DIAGNOSIS — E66.9 OBESITY (BMI 30-39.9): ICD-10-CM

## 2020-07-24 DIAGNOSIS — I34.0 MITRAL REGURGITATION: ICD-10-CM

## 2020-07-24 DIAGNOSIS — I49.9 ARRHYTHMIA: ICD-10-CM

## 2020-07-24 DIAGNOSIS — R73.9 ACUTE HYPERGLYCEMIA: ICD-10-CM

## 2020-07-24 LAB
ALLENS TEST: ABNORMAL
ANION GAP SERPL CALC-SCNC: 8 MMOL/L (ref 8–16)
APTT BLDCRRT: 30.3 SEC (ref 21–32)
APTT BLDCRRT: 33.1 SEC (ref 21–32)
BASOPHILS # BLD AUTO: 0.04 K/UL (ref 0–0.2)
BASOPHILS NFR BLD: 0.2 % (ref 0–1.9)
BLD PROD TYP BPU: NORMAL
BLOOD UNIT EXPIRATION DATE: NORMAL
BLOOD UNIT TYPE CODE: 6200
BLOOD UNIT TYPE: NORMAL
BUN SERPL-MCNC: 17 MG/DL (ref 6–20)
CALCIUM SERPL-MCNC: 7.8 MG/DL (ref 8.7–10.5)
CHLORIDE SERPL-SCNC: 114 MMOL/L (ref 95–110)
CO2 SERPL-SCNC: 20 MMOL/L (ref 23–29)
CODING SYSTEM: NORMAL
CREAT SERPL-MCNC: 1.4 MG/DL (ref 0.5–1.4)
DELSYS: ABNORMAL
DIFFERENTIAL METHOD: ABNORMAL
DISPENSE STATUS: NORMAL
EOSINOPHIL # BLD AUTO: 0 K/UL (ref 0–0.5)
EOSINOPHIL NFR BLD: 0.1 % (ref 0–8)
ERYTHROCYTE [DISTWIDTH] IN BLOOD BY AUTOMATED COUNT: 13 % (ref 11.5–14.5)
EST. GFR  (AFRICAN AMERICAN): >60 ML/MIN/1.73 M^2
EST. GFR  (NON AFRICAN AMERICAN): 56.6 ML/MIN/1.73 M^2
FIO2: 0.5
FIO2: 100
FIO2: 55
FIO2: 60
FIO2: 65
FIO2: 80
FIO2: 80
GLUCOSE SERPL-MCNC: 106 MG/DL (ref 70–110)
GLUCOSE SERPL-MCNC: 117 MG/DL (ref 70–110)
GLUCOSE SERPL-MCNC: 128 MG/DL (ref 70–110)
GLUCOSE SERPL-MCNC: 133 MG/DL (ref 70–110)
GLUCOSE SERPL-MCNC: 148 MG/DL (ref 70–110)
GLUCOSE SERPL-MCNC: 150 MG/DL (ref 70–110)
GLUCOSE SERPL-MCNC: 155 MG/DL (ref 70–110)
GLUCOSE SERPL-MCNC: 158 MG/DL (ref 70–110)
GLUCOSE SERPL-MCNC: 172 MG/DL (ref 70–110)
GLUCOSE SERPL-MCNC: 173 MG/DL (ref 70–110)
GLUCOSE SERPL-MCNC: 174 MG/DL (ref 70–110)
GLUCOSE SERPL-MCNC: 177 MG/DL (ref 70–110)
GLUCOSE SERPL-MCNC: 178 MG/DL (ref 70–110)
GLUCOSE SERPL-MCNC: 186 MG/DL (ref 70–110)
GLUCOSE SERPL-MCNC: 253 MG/DL (ref 70–110)
HCO3 UR-SCNC: 19.1 MMOL/L (ref 24–28)
HCO3 UR-SCNC: 20.6 MMOL/L (ref 24–28)
HCO3 UR-SCNC: 21.5 MMOL/L (ref 24–28)
HCO3 UR-SCNC: 21.9 MMOL/L (ref 24–28)
HCO3 UR-SCNC: 22.7 MMOL/L (ref 24–28)
HCO3 UR-SCNC: 22.7 MMOL/L (ref 24–28)
HCO3 UR-SCNC: 23.2 MMOL/L (ref 24–28)
HCO3 UR-SCNC: 23.5 MMOL/L (ref 24–28)
HCO3 UR-SCNC: 23.5 MMOL/L (ref 24–28)
HCO3 UR-SCNC: 23.7 MMOL/L (ref 24–28)
HCO3 UR-SCNC: 24.1 MMOL/L (ref 24–28)
HCO3 UR-SCNC: 24.3 MMOL/L (ref 24–28)
HCO3 UR-SCNC: 24.3 MMOL/L (ref 24–28)
HCO3 UR-SCNC: 24.8 MMOL/L (ref 24–28)
HCO3 UR-SCNC: 25.3 MMOL/L (ref 24–28)
HCO3 UR-SCNC: 25.6 MMOL/L (ref 24–28)
HCO3 UR-SCNC: 26.1 MMOL/L (ref 24–28)
HCO3 UR-SCNC: 26.2 MMOL/L (ref 24–28)
HCO3 UR-SCNC: 26.3 MMOL/L (ref 24–28)
HCO3 UR-SCNC: 29.8 MMOL/L (ref 24–28)
HCT VFR BLD AUTO: 30.4 % (ref 40–54)
HCT VFR BLD CALC: 22 %PCV (ref 36–54)
HCT VFR BLD CALC: 25 %PCV (ref 36–54)
HCT VFR BLD CALC: 26 %PCV (ref 36–54)
HCT VFR BLD CALC: 27 %PCV (ref 36–54)
HCT VFR BLD CALC: 28 %PCV (ref 36–54)
HCT VFR BLD CALC: 31 %PCV (ref 36–54)
HCT VFR BLD CALC: 32 %PCV (ref 36–54)
HCT VFR BLD CALC: 34 %PCV (ref 36–54)
HCT VFR BLD CALC: 35 %PCV (ref 36–54)
HGB BLD-MCNC: 10.3 G/DL (ref 14–18)
IMM GRANULOCYTES # BLD AUTO: 0.1 K/UL (ref 0–0.04)
IMM GRANULOCYTES NFR BLD AUTO: 0.5 % (ref 0–0.5)
INR PPP: 1.1 (ref 0.8–1.2)
INR PPP: 1.1 (ref 0.8–1.2)
LDH SERPL L TO P-CCNC: 0.77 MMOL/L (ref 0.36–1.25)
LDH SERPL L TO P-CCNC: 1.41 MMOL/L (ref 0.5–2.2)
LDH SERPL L TO P-CCNC: 1.53 MMOL/L (ref 0.36–1.25)
LDH SERPL L TO P-CCNC: 5.15 MMOL/L (ref 0.36–1.25)
LDH SERPL L TO P-CCNC: 5.24 MMOL/L (ref 0.36–1.25)
LDH SERPL L TO P-CCNC: 6.15 MMOL/L (ref 0.36–1.25)
LYMPHOCYTES # BLD AUTO: 1 K/UL (ref 1–4.8)
LYMPHOCYTES NFR BLD: 5.1 % (ref 18–48)
MAGNESIUM SERPL-MCNC: 3.1 MG/DL (ref 1.6–2.6)
MAGNESIUM SERPL-MCNC: 3.1 MG/DL (ref 1.6–2.6)
MCH RBC QN AUTO: 31.3 PG (ref 27–31)
MCHC RBC AUTO-ENTMCNC: 33.9 G/DL (ref 32–36)
MCV RBC AUTO: 92 FL (ref 82–98)
MODE: ABNORMAL
MONOCYTES # BLD AUTO: 1.7 K/UL (ref 0.3–1)
MONOCYTES NFR BLD: 8.4 % (ref 4–15)
NEUTROPHILS # BLD AUTO: 17 K/UL (ref 1.8–7.7)
NEUTROPHILS NFR BLD: 85.7 % (ref 38–73)
NRBC BLD-RTO: 0 /100 WBC
PCO2 BLDA: 36.5 MMHG (ref 35–45)
PCO2 BLDA: 37 MMHG (ref 35–45)
PCO2 BLDA: 37.5 MMHG (ref 35–45)
PCO2 BLDA: 37.6 MMHG (ref 35–45)
PCO2 BLDA: 37.8 MMHG (ref 35–45)
PCO2 BLDA: 38.3 MMHG (ref 35–45)
PCO2 BLDA: 38.4 MMHG (ref 35–45)
PCO2 BLDA: 38.5 MMHG (ref 35–45)
PCO2 BLDA: 38.6 MMHG (ref 35–45)
PCO2 BLDA: 39.1 MMHG (ref 35–45)
PCO2 BLDA: 40.4 MMHG (ref 35–45)
PCO2 BLDA: 40.5 MMHG (ref 35–45)
PCO2 BLDA: 40.6 MMHG (ref 35–45)
PCO2 BLDA: 42.3 MMHG (ref 35–45)
PCO2 BLDA: 42.7 MMHG (ref 35–45)
PCO2 BLDA: 42.8 MMHG (ref 35–45)
PCO2 BLDA: 43.2 MMHG (ref 35–45)
PCO2 BLDA: 43.8 MMHG (ref 35–45)
PCO2 BLDA: 43.8 MMHG (ref 35–45)
PCO2 BLDA: 46.6 MMHG (ref 35–45)
PH SMN: 7.25 [PH] (ref 7.35–7.45)
PH SMN: 7.31 [PH] (ref 7.35–7.45)
PH SMN: 7.33 [PH] (ref 7.35–7.45)
PH SMN: 7.34 [PH] (ref 7.35–7.45)
PH SMN: 7.34 [PH] (ref 7.35–7.45)
PH SMN: 7.36 [PH] (ref 7.35–7.45)
PH SMN: 7.37 [PH] (ref 7.35–7.45)
PH SMN: 7.38 [PH] (ref 7.35–7.45)
PH SMN: 7.4 [PH] (ref 7.35–7.45)
PH SMN: 7.4 [PH] (ref 7.35–7.45)
PH SMN: 7.41 [PH] (ref 7.35–7.45)
PH SMN: 7.42 [PH] (ref 7.35–7.45)
PH SMN: 7.43 [PH] (ref 7.35–7.45)
PH SMN: 7.45 [PH] (ref 7.35–7.45)
PHOSPHATE SERPL-MCNC: 1.9 MG/DL (ref 2.7–4.5)
PHOSPHATE SERPL-MCNC: 1.9 MG/DL (ref 2.7–4.5)
PLATELET # BLD AUTO: 170 K/UL (ref 150–350)
PMV BLD AUTO: 11.4 FL (ref 9.2–12.9)
PO2 BLDA: 157 MMHG (ref 80–100)
PO2 BLDA: 193 MMHG (ref 80–100)
PO2 BLDA: 209 MMHG (ref 80–100)
PO2 BLDA: 232 MMHG (ref 80–100)
PO2 BLDA: 232 MMHG (ref 80–100)
PO2 BLDA: 234 MMHG (ref 80–100)
PO2 BLDA: 248 MMHG (ref 80–100)
PO2 BLDA: 254 MMHG (ref 80–100)
PO2 BLDA: 258 MMHG (ref 80–100)
PO2 BLDA: 288 MMHG (ref 80–100)
PO2 BLDA: 38 MMHG (ref 40–60)
PO2 BLDA: 38 MMHG (ref 40–60)
PO2 BLDA: 428 MMHG (ref 80–100)
PO2 BLDA: 433 MMHG (ref 80–100)
PO2 BLDA: 458 MMHG (ref 80–100)
PO2 BLDA: 516 MMHG (ref 80–100)
PO2 BLDA: 572 MMHG (ref 80–100)
PO2 BLDA: 651 MMHG (ref 80–100)
PO2 BLDA: 71 MMHG (ref 80–100)
PO2 BLDA: 75 MMHG (ref 80–100)
POC BE: -1 MMOL/L
POC BE: -2 MMOL/L
POC BE: -3 MMOL/L
POC BE: -3 MMOL/L
POC BE: -5 MMOL/L
POC BE: -5 MMOL/L
POC BE: -8 MMOL/L
POC BE: 0 MMOL/L
POC BE: 0 MMOL/L
POC BE: 1 MMOL/L
POC BE: 2 MMOL/L
POC BE: 5 MMOL/L
POC IONIZED CALCIUM: 0.89 MMOL/L (ref 1.06–1.42)
POC IONIZED CALCIUM: 0.9 MMOL/L (ref 1.06–1.42)
POC IONIZED CALCIUM: 0.95 MMOL/L (ref 1.06–1.42)
POC IONIZED CALCIUM: 0.98 MMOL/L (ref 1.06–1.42)
POC IONIZED CALCIUM: 0.99 MMOL/L (ref 1.06–1.42)
POC IONIZED CALCIUM: 1 MMOL/L (ref 1.06–1.42)
POC IONIZED CALCIUM: 1.03 MMOL/L (ref 1.06–1.42)
POC IONIZED CALCIUM: 1.03 MMOL/L (ref 1.06–1.42)
POC IONIZED CALCIUM: 1.04 MMOL/L (ref 1.06–1.42)
POC IONIZED CALCIUM: 1.05 MMOL/L (ref 1.06–1.42)
POC IONIZED CALCIUM: 1.06 MMOL/L (ref 1.06–1.42)
POC IONIZED CALCIUM: 1.07 MMOL/L (ref 1.06–1.42)
POC IONIZED CALCIUM: 1.08 MMOL/L (ref 1.06–1.42)
POC IONIZED CALCIUM: 1.09 MMOL/L (ref 1.06–1.42)
POC IONIZED CALCIUM: 1.19 MMOL/L (ref 1.06–1.42)
POC IONIZED CALCIUM: 1.2 MMOL/L (ref 1.06–1.42)
POC IONIZED CALCIUM: 1.21 MMOL/L (ref 1.06–1.42)
POC PCO2 TEMP: 43.8 MMHG
POC PH TEMP: 7.36
POC PO2 TEMP: 157 MMHG
POC SATURATED O2: 100 % (ref 95–100)
POC SATURATED O2: 69 % (ref 95–100)
POC SATURATED O2: 74 % (ref 95–100)
POC SATURATED O2: 92 % (ref 95–100)
POC SATURATED O2: 93 % (ref 95–100)
POC SATURATED O2: 99 % (ref 95–100)
POC TCO2: 20 MMOL/L (ref 23–27)
POC TCO2: 22 MMOL/L (ref 23–27)
POC TCO2: 23 MMOL/L (ref 23–27)
POC TCO2: 23 MMOL/L (ref 23–27)
POC TCO2: 24 MMOL/L (ref 23–27)
POC TCO2: 25 MMOL/L (ref 23–27)
POC TCO2: 25 MMOL/L (ref 24–29)
POC TCO2: 26 MMOL/L (ref 23–27)
POC TCO2: 26 MMOL/L (ref 23–27)
POC TCO2: 27 MMOL/L (ref 23–27)
POC TCO2: 27 MMOL/L (ref 24–29)
POC TCO2: 31 MMOL/L (ref 23–27)
POC TEMPERATURE: ABNORMAL
POCT GLUCOSE: 153 MG/DL (ref 70–110)
POCT GLUCOSE: 167 MG/DL (ref 70–110)
POCT GLUCOSE: 174 MG/DL (ref 70–110)
POCT GLUCOSE: 177 MG/DL (ref 70–110)
POCT GLUCOSE: 196 MG/DL (ref 70–110)
POCT GLUCOSE: 207 MG/DL (ref 70–110)
POTASSIUM BLD-SCNC: 3.7 MMOL/L (ref 3.5–5.1)
POTASSIUM BLD-SCNC: 4 MMOL/L (ref 3.5–5.1)
POTASSIUM BLD-SCNC: 4 MMOL/L (ref 3.5–5.1)
POTASSIUM BLD-SCNC: 4.1 MMOL/L (ref 3.5–5.1)
POTASSIUM BLD-SCNC: 4.3 MMOL/L (ref 3.5–5.1)
POTASSIUM BLD-SCNC: 4.7 MMOL/L (ref 3.5–5.1)
POTASSIUM BLD-SCNC: 4.7 MMOL/L (ref 3.5–5.1)
POTASSIUM BLD-SCNC: 4.8 MMOL/L (ref 3.5–5.1)
POTASSIUM BLD-SCNC: 4.8 MMOL/L (ref 3.5–5.1)
POTASSIUM BLD-SCNC: 5 MMOL/L (ref 3.5–5.1)
POTASSIUM BLD-SCNC: 5.3 MMOL/L (ref 3.5–5.1)
POTASSIUM BLD-SCNC: 5.4 MMOL/L (ref 3.5–5.1)
POTASSIUM BLD-SCNC: 6.5 MMOL/L (ref 3.5–5.1)
POTASSIUM BLD-SCNC: 6.8 MMOL/L (ref 3.5–5.1)
POTASSIUM BLD-SCNC: 6.8 MMOL/L (ref 3.5–5.1)
POTASSIUM SERPL-SCNC: 4.2 MMOL/L (ref 3.5–5.1)
POTASSIUM SERPL-SCNC: 4.4 MMOL/L (ref 3.5–5.1)
PROTHROMBIN TIME: 12 SEC (ref 9–12.5)
PROTHROMBIN TIME: 12.4 SEC (ref 9–12.5)
RBC # BLD AUTO: 3.29 M/UL (ref 4.6–6.2)
SAMPLE: ABNORMAL
SITE: ABNORMAL
SODIUM BLD-SCNC: 138 MMOL/L (ref 136–145)
SODIUM BLD-SCNC: 139 MMOL/L (ref 136–145)
SODIUM BLD-SCNC: 140 MMOL/L (ref 136–145)
SODIUM BLD-SCNC: 141 MMOL/L (ref 136–145)
SODIUM BLD-SCNC: 142 MMOL/L (ref 136–145)
SODIUM BLD-SCNC: 142 MMOL/L (ref 136–145)
SODIUM BLD-SCNC: 144 MMOL/L (ref 136–145)
SODIUM BLD-SCNC: 144 MMOL/L (ref 136–145)
SODIUM BLD-SCNC: 147 MMOL/L (ref 136–145)
SODIUM SERPL-SCNC: 142 MMOL/L (ref 136–145)
TRANS PLATPHERESIS VOL PATIENT: NORMAL ML
WBC # BLD AUTO: 19.81 K/UL (ref 3.9–12.7)

## 2020-07-24 PROCEDURE — 25000003 PHARM REV CODE 250: Performed by: NURSE PRACTITIONER

## 2020-07-24 PROCEDURE — 27200953 HC CARDIOPLEGIA SYSTEM

## 2020-07-24 PROCEDURE — 94640 AIRWAY INHALATION TREATMENT: CPT

## 2020-07-24 PROCEDURE — 85025 COMPLETE CBC W/AUTO DIFF WBC: CPT

## 2020-07-24 PROCEDURE — 37799 UNLISTED PX VASCULAR SURGERY: CPT

## 2020-07-24 PROCEDURE — 93320 PR DOPPLER ECHO HEART,COMPLETE: ICD-10-PCS | Mod: 26,59,, | Performed by: ANESTHESIOLOGY

## 2020-07-24 PROCEDURE — 27201423 OPTIME MED/SURG SUP & DEVICES STERILE SUPPLY: Performed by: THORACIC SURGERY (CARDIOTHORACIC VASCULAR SURGERY)

## 2020-07-24 PROCEDURE — 99900035 HC TECH TIME PER 15 MIN (STAT)

## 2020-07-24 PROCEDURE — 63600175 PHARM REV CODE 636 W HCPCS: Performed by: STUDENT IN AN ORGANIZED HEALTH CARE EDUCATION/TRAINING PROGRAM

## 2020-07-24 PROCEDURE — 83735 ASSAY OF MAGNESIUM: CPT

## 2020-07-24 PROCEDURE — 93312 ECHO TRANSESOPHAGEAL: CPT | Mod: 26,59,, | Performed by: ANESTHESIOLOGY

## 2020-07-24 PROCEDURE — D9220A PRA ANESTHESIA: Mod: ,,, | Performed by: ANESTHESIOLOGY

## 2020-07-24 PROCEDURE — P9045 ALBUMIN (HUMAN), 5%, 250 ML: HCPCS | Mod: JG | Performed by: NURSE PRACTITIONER

## 2020-07-24 PROCEDURE — 36620 INSERTION CATHETER ARTERY: CPT | Mod: ,,, | Performed by: ANESTHESIOLOGY

## 2020-07-24 PROCEDURE — 85610 PROTHROMBIN TIME: CPT

## 2020-07-24 PROCEDURE — 99233 PR SUBSEQUENT HOSPITAL CARE,LEVL III: ICD-10-PCS | Mod: ,,, | Performed by: SURGERY

## 2020-07-24 PROCEDURE — 27000175 HC ADULT BYPASS PUMP

## 2020-07-24 PROCEDURE — 27202608 HC CANNULA, MISC

## 2020-07-24 PROCEDURE — 93010 EKG 12-LEAD: ICD-10-PCS | Mod: ,,, | Performed by: INTERNAL MEDICINE

## 2020-07-24 PROCEDURE — 93325 PR DOPPLER COLOR FLOW VELOCITY MAP: ICD-10-PCS | Mod: 26,59,, | Performed by: ANESTHESIOLOGY

## 2020-07-24 PROCEDURE — 85520 HEPARIN ASSAY: CPT

## 2020-07-24 PROCEDURE — 25000003 PHARM REV CODE 250: Performed by: STUDENT IN AN ORGANIZED HEALTH CARE EDUCATION/TRAINING PROGRAM

## 2020-07-24 PROCEDURE — 27800595 HC HEART VALVES

## 2020-07-24 PROCEDURE — 36556 PR INSERT NON-TUNNEL CV CATH 5+ YRS OLD: ICD-10-PCS | Mod: 59,,, | Performed by: ANESTHESIOLOGY

## 2020-07-24 PROCEDURE — 88305 TISSUE EXAM BY PATHOLOGIST: CPT | Mod: 26,,, | Performed by: PATHOLOGY

## 2020-07-24 PROCEDURE — 25000242 PHARM REV CODE 250 ALT 637 W/ HCPCS: Performed by: STUDENT IN AN ORGANIZED HEALTH CARE EDUCATION/TRAINING PROGRAM

## 2020-07-24 PROCEDURE — 27000221 HC OXYGEN, UP TO 24 HOURS

## 2020-07-24 PROCEDURE — 27000191 HC C-V MONITORING

## 2020-07-24 PROCEDURE — C1729 CATH, DRAINAGE: HCPCS | Performed by: THORACIC SURGERY (CARDIOTHORACIC VASCULAR SURGERY)

## 2020-07-24 PROCEDURE — 88305 TISSUE EXAM BY PATHOLOGIST: ICD-10-PCS | Mod: 26,,, | Performed by: PATHOLOGY

## 2020-07-24 PROCEDURE — 36556 INSERT NON-TUNNEL CV CATH: CPT | Mod: 59,,, | Performed by: ANESTHESIOLOGY

## 2020-07-24 PROCEDURE — 82803 BLOOD GASES ANY COMBINATION: CPT

## 2020-07-24 PROCEDURE — 83605 ASSAY OF LACTIC ACID: CPT

## 2020-07-24 PROCEDURE — 84132 ASSAY OF SERUM POTASSIUM: CPT

## 2020-07-24 PROCEDURE — 99223 PR INITIAL HOSPITAL CARE,LEVL III: ICD-10-PCS | Mod: ,,, | Performed by: NURSE PRACTITIONER

## 2020-07-24 PROCEDURE — 94761 N-INVAS EAR/PLS OXIMETRY MLT: CPT

## 2020-07-24 PROCEDURE — 37000009 HC ANESTHESIA EA ADD 15 MINS: Performed by: THORACIC SURGERY (CARDIOTHORACIC VASCULAR SURGERY)

## 2020-07-24 PROCEDURE — 93010 ELECTROCARDIOGRAM REPORT: CPT | Mod: ,,, | Performed by: INTERNAL MEDICINE

## 2020-07-24 PROCEDURE — P9035 PLATELET PHERES LEUKOREDUCED: HCPCS

## 2020-07-24 PROCEDURE — 93005 ELECTROCARDIOGRAM TRACING: CPT

## 2020-07-24 PROCEDURE — 80048 BASIC METABOLIC PNL TOTAL CA: CPT

## 2020-07-24 PROCEDURE — 37000008 HC ANESTHESIA 1ST 15 MINUTES: Performed by: THORACIC SURGERY (CARDIOTHORACIC VASCULAR SURGERY)

## 2020-07-24 PROCEDURE — 93312 PR ECHO HEART,TRANSESOPHAGEAL: ICD-10-PCS | Mod: 26,59,, | Performed by: ANESTHESIOLOGY

## 2020-07-24 PROCEDURE — 64450 NJX AA&/STRD OTHER PN/BRANCH: CPT | Mod: 59,50,, | Performed by: ANESTHESIOLOGY

## 2020-07-24 PROCEDURE — 85610 PROTHROMBIN TIME: CPT | Mod: 91

## 2020-07-24 PROCEDURE — 93325 DOPPLER ECHO COLOR FLOW MAPG: CPT | Mod: 26,59,, | Performed by: ANESTHESIOLOGY

## 2020-07-24 PROCEDURE — 36000712 HC OR TIME LEV V 1ST 15 MIN: Performed by: THORACIC SURGERY (CARDIOTHORACIC VASCULAR SURGERY)

## 2020-07-24 PROCEDURE — 85730 THROMBOPLASTIN TIME PARTIAL: CPT

## 2020-07-24 PROCEDURE — 84100 ASSAY OF PHOSPHORUS: CPT

## 2020-07-24 PROCEDURE — 64450 TTP BLOCK: ICD-10-PCS | Mod: 59,50,, | Performed by: ANESTHESIOLOGY

## 2020-07-24 PROCEDURE — 27100088 HC CELL SAVER

## 2020-07-24 PROCEDURE — 33427 PR MITRALPLASTY W RADICAL RECONSTR: ICD-10-PCS | Mod: ,,, | Performed by: THORACIC SURGERY (CARDIOTHORACIC VASCULAR SURGERY)

## 2020-07-24 PROCEDURE — 36620 ARTERIAL: ICD-10-PCS | Mod: ,,, | Performed by: ANESTHESIOLOGY

## 2020-07-24 PROCEDURE — 63600175 PHARM REV CODE 636 W HCPCS: Performed by: NURSE PRACTITIONER

## 2020-07-24 PROCEDURE — 27201037 HC PRESSURE MONITORING SET UP

## 2020-07-24 PROCEDURE — 88305 TISSUE EXAM BY PATHOLOGIST: CPT | Performed by: PATHOLOGY

## 2020-07-24 PROCEDURE — 82330 ASSAY OF CALCIUM: CPT

## 2020-07-24 PROCEDURE — 36000713 HC OR TIME LEV V EA ADD 15 MIN: Performed by: THORACIC SURGERY (CARDIOTHORACIC VASCULAR SURGERY)

## 2020-07-24 PROCEDURE — 76937 PR  US GUIDE, VASCULAR ACCESS: ICD-10-PCS | Mod: 26,,, | Performed by: ANESTHESIOLOGY

## 2020-07-24 PROCEDURE — 25000242 PHARM REV CODE 250 ALT 637 W/ HCPCS: Performed by: NURSE PRACTITIONER

## 2020-07-24 PROCEDURE — 85730 THROMBOPLASTIN TIME PARTIAL: CPT | Mod: 91

## 2020-07-24 PROCEDURE — 84295 ASSAY OF SERUM SODIUM: CPT

## 2020-07-24 PROCEDURE — 63600175 PHARM REV CODE 636 W HCPCS: Mod: JG | Performed by: STUDENT IN AN ORGANIZED HEALTH CARE EDUCATION/TRAINING PROGRAM

## 2020-07-24 PROCEDURE — 33427 REPAIR OF MITRAL VALVE: CPT | Mod: ,,, | Performed by: THORACIC SURGERY (CARDIOTHORACIC VASCULAR SURGERY)

## 2020-07-24 PROCEDURE — 99223 1ST HOSP IP/OBS HIGH 75: CPT | Mod: ,,, | Performed by: NURSE PRACTITIONER

## 2020-07-24 PROCEDURE — 85014 HEMATOCRIT: CPT

## 2020-07-24 PROCEDURE — 99233 SBSQ HOSP IP/OBS HIGH 50: CPT | Mod: ,,, | Performed by: SURGERY

## 2020-07-24 PROCEDURE — P9045 ALBUMIN (HUMAN), 5%, 250 ML: HCPCS | Mod: JG | Performed by: STUDENT IN AN ORGANIZED HEALTH CARE EDUCATION/TRAINING PROGRAM

## 2020-07-24 PROCEDURE — 76937 US GUIDE VASCULAR ACCESS: CPT | Mod: 26,,, | Performed by: ANESTHESIOLOGY

## 2020-07-24 PROCEDURE — 25000003 PHARM REV CODE 250: Performed by: THORACIC SURGERY (CARDIOTHORACIC VASCULAR SURGERY)

## 2020-07-24 PROCEDURE — 20000000 HC ICU ROOM

## 2020-07-24 PROCEDURE — 93320 DOPPLER ECHO COMPLETE: CPT | Mod: 26,59,, | Performed by: ANESTHESIOLOGY

## 2020-07-24 PROCEDURE — D9220A PRA ANESTHESIA: ICD-10-PCS | Mod: ,,, | Performed by: ANESTHESIOLOGY

## 2020-07-24 PROCEDURE — 36592 COLLECT BLOOD FROM PICC: CPT

## 2020-07-24 DEVICE — IMPLANTABLE DEVICE: Type: IMPLANTABLE DEVICE | Site: HEART | Status: FUNCTIONAL

## 2020-07-24 RX ORDER — DOCUSATE SODIUM 100 MG/1
100 CAPSULE, LIQUID FILLED ORAL 2 TIMES DAILY
Status: DISCONTINUED | OUTPATIENT
Start: 2020-07-24 | End: 2020-07-28 | Stop reason: HOSPADM

## 2020-07-24 RX ORDER — ROCURONIUM BROMIDE 10 MG/ML
INJECTION, SOLUTION INTRAVENOUS
Status: DISCONTINUED | OUTPATIENT
Start: 2020-07-24 | End: 2020-07-24

## 2020-07-24 RX ORDER — FENTANYL CITRATE 50 UG/ML
25 INJECTION, SOLUTION INTRAMUSCULAR; INTRAVENOUS
Status: DISCONTINUED | OUTPATIENT
Start: 2020-07-24 | End: 2020-07-24

## 2020-07-24 RX ORDER — INDOMETHACIN 25 MG/1
100 CAPSULE ORAL ONCE
Status: COMPLETED | OUTPATIENT
Start: 2020-07-24 | End: 2020-07-24

## 2020-07-24 RX ORDER — PROTAMINE SULFATE 10 MG/ML
INJECTION, SOLUTION INTRAVENOUS
Status: DISCONTINUED | OUTPATIENT
Start: 2020-07-24 | End: 2020-07-24

## 2020-07-24 RX ORDER — ASPIRIN 300 MG/1
300 SUPPOSITORY RECTAL ONCE AS NEEDED
Status: DISCONTINUED | OUTPATIENT
Start: 2020-07-24 | End: 2020-07-28 | Stop reason: HOSPADM

## 2020-07-24 RX ORDER — LIDOCAINE HCL/PF 100 MG/5ML
SYRINGE (ML) INTRAVENOUS
Status: DISCONTINUED | OUTPATIENT
Start: 2020-07-24 | End: 2020-07-24

## 2020-07-24 RX ORDER — PANTOPRAZOLE SODIUM 40 MG/10ML
40 INJECTION, POWDER, LYOPHILIZED, FOR SOLUTION INTRAVENOUS DAILY
Status: DISCONTINUED | OUTPATIENT
Start: 2020-07-25 | End: 2020-07-27

## 2020-07-24 RX ORDER — MAGNESIUM SULFATE HEPTAHYDRATE 40 MG/ML
4 INJECTION, SOLUTION INTRAVENOUS
Status: DISCONTINUED | OUTPATIENT
Start: 2020-07-24 | End: 2020-07-28 | Stop reason: HOSPADM

## 2020-07-24 RX ORDER — FENTANYL CITRATE 50 UG/ML
50 INJECTION, SOLUTION INTRAMUSCULAR; INTRAVENOUS
Status: DISCONTINUED | OUTPATIENT
Start: 2020-07-26 | End: 2020-07-24

## 2020-07-24 RX ORDER — ACETAMINOPHEN 325 MG/1
650 TABLET ORAL EVERY 4 HOURS PRN
Status: DISCONTINUED | OUTPATIENT
Start: 2020-07-24 | End: 2020-07-28 | Stop reason: HOSPADM

## 2020-07-24 RX ORDER — DEXTROSE MONOHYDRATE, SODIUM CHLORIDE, AND POTASSIUM CHLORIDE 50; 1.49; 4.5 G/1000ML; G/1000ML; G/1000ML
INJECTION, SOLUTION INTRAVENOUS CONTINUOUS
Status: DISCONTINUED | OUTPATIENT
Start: 2020-07-24 | End: 2020-07-28 | Stop reason: HOSPADM

## 2020-07-24 RX ORDER — FENTANYL CITRATE 50 UG/ML
INJECTION, SOLUTION INTRAMUSCULAR; INTRAVENOUS
Status: DISCONTINUED | OUTPATIENT
Start: 2020-07-24 | End: 2020-07-24

## 2020-07-24 RX ORDER — ALBUMIN HUMAN 50 G/1000ML
25 SOLUTION INTRAVENOUS ONCE
Status: COMPLETED | OUTPATIENT
Start: 2020-07-24 | End: 2020-07-24

## 2020-07-24 RX ORDER — HEPARIN SODIUM 1000 [USP'U]/ML
INJECTION, SOLUTION INTRAVENOUS; SUBCUTANEOUS
Status: DISCONTINUED | OUTPATIENT
Start: 2020-07-24 | End: 2020-07-24

## 2020-07-24 RX ORDER — HYDROMORPHONE HYDROCHLORIDE 1 MG/ML
0.5 INJECTION, SOLUTION INTRAMUSCULAR; INTRAVENOUS; SUBCUTANEOUS ONCE AS NEEDED
Status: COMPLETED | OUTPATIENT
Start: 2020-07-24 | End: 2020-07-24

## 2020-07-24 RX ORDER — CEFAZOLIN SODIUM 1 G/3ML
2 INJECTION, POWDER, FOR SOLUTION INTRAMUSCULAR; INTRAVENOUS
Status: COMPLETED | OUTPATIENT
Start: 2020-07-24 | End: 2020-07-24

## 2020-07-24 RX ORDER — ALBUMIN HUMAN 50 G/1000ML
500 SOLUTION INTRAVENOUS ONCE AS NEEDED
Status: COMPLETED | OUTPATIENT
Start: 2020-07-24 | End: 2020-07-24

## 2020-07-24 RX ORDER — PROTAMINE SULFATE 10 MG/ML
25 INJECTION, SOLUTION INTRAVENOUS ONCE
Status: DISCONTINUED | OUTPATIENT
Start: 2020-07-24 | End: 2020-07-24

## 2020-07-24 RX ORDER — MUPIROCIN 20 MG/G
1 OINTMENT TOPICAL 2 TIMES DAILY
Status: DISCONTINUED | OUTPATIENT
Start: 2020-07-24 | End: 2020-07-28 | Stop reason: HOSPADM

## 2020-07-24 RX ORDER — ONDANSETRON 2 MG/ML
4 INJECTION INTRAMUSCULAR; INTRAVENOUS EVERY 12 HOURS PRN
Status: DISCONTINUED | OUTPATIENT
Start: 2020-07-24 | End: 2020-07-28 | Stop reason: HOSPADM

## 2020-07-24 RX ORDER — ASPIRIN 325 MG
325 TABLET ORAL ONCE
Status: DISCONTINUED | OUTPATIENT
Start: 2020-07-25 | End: 2020-07-27

## 2020-07-24 RX ORDER — TRANEXAMIC ACID 100 MG/ML
INJECTION, SOLUTION INTRAVENOUS CONTINUOUS PRN
Status: DISCONTINUED | OUTPATIENT
Start: 2020-07-24 | End: 2020-07-24

## 2020-07-24 RX ORDER — ONDANSETRON 2 MG/ML
INJECTION INTRAMUSCULAR; INTRAVENOUS
Status: DISCONTINUED | OUTPATIENT
Start: 2020-07-24 | End: 2020-07-24

## 2020-07-24 RX ORDER — NOREPINEPHRINE BITARTRATE 1 MG/ML
INJECTION, SOLUTION INTRAVENOUS
Status: DISCONTINUED | OUTPATIENT
Start: 2020-07-24 | End: 2020-07-24

## 2020-07-24 RX ORDER — MIDAZOLAM HYDROCHLORIDE 1 MG/ML
INJECTION INTRAMUSCULAR; INTRAVENOUS
Status: DISCONTINUED | OUTPATIENT
Start: 2020-07-24 | End: 2020-07-24

## 2020-07-24 RX ORDER — KETAMINE HYDROCHLORIDE 10 MG/ML
INJECTION, SOLUTION INTRAMUSCULAR; INTRAVENOUS
Status: DISCONTINUED | OUTPATIENT
Start: 2020-07-24 | End: 2020-07-24

## 2020-07-24 RX ORDER — NITROGLYCERIN 5 MG/ML
INJECTION, SOLUTION INTRAVENOUS
Status: DISCONTINUED | OUTPATIENT
Start: 2020-07-24 | End: 2020-07-24

## 2020-07-24 RX ORDER — METOPROLOL TARTRATE 25 MG/1
25 TABLET, FILM COATED ORAL
Status: DISCONTINUED | OUTPATIENT
Start: 2020-07-24 | End: 2020-07-24

## 2020-07-24 RX ORDER — METOCLOPRAMIDE HYDROCHLORIDE 5 MG/ML
5 INJECTION INTRAMUSCULAR; INTRAVENOUS EVERY 6 HOURS PRN
Status: DISCONTINUED | OUTPATIENT
Start: 2020-07-24 | End: 2020-07-28 | Stop reason: HOSPADM

## 2020-07-24 RX ORDER — GLYCOPYRROLATE 0.2 MG/ML
INJECTION INTRAMUSCULAR; INTRAVENOUS
Status: DISCONTINUED | OUTPATIENT
Start: 2020-07-24 | End: 2020-07-24

## 2020-07-24 RX ORDER — LIDOCAINE HYDROCHLORIDE 10 MG/ML
1 INJECTION, SOLUTION EPIDURAL; INFILTRATION; INTRACAUDAL; PERINEURAL
Status: DISCONTINUED | OUTPATIENT
Start: 2020-07-24 | End: 2020-07-24

## 2020-07-24 RX ORDER — CEFAZOLIN SODIUM 1 G/3ML
2 INJECTION, POWDER, FOR SOLUTION INTRAMUSCULAR; INTRAVENOUS
Status: COMPLETED | OUTPATIENT
Start: 2020-07-24 | End: 2020-07-26

## 2020-07-24 RX ORDER — ASPIRIN 325 MG
325 TABLET ORAL DAILY
Status: DISCONTINUED | OUTPATIENT
Start: 2020-07-25 | End: 2020-07-28 | Stop reason: HOSPADM

## 2020-07-24 RX ORDER — POTASSIUM CHLORIDE 14.9 MG/ML
60 INJECTION INTRAVENOUS
Status: DISCONTINUED | OUTPATIENT
Start: 2020-07-24 | End: 2020-07-28 | Stop reason: HOSPADM

## 2020-07-24 RX ORDER — SODIUM CHLORIDE 9 MG/ML
INJECTION, SOLUTION INTRAVENOUS CONTINUOUS
Status: DISCONTINUED | OUTPATIENT
Start: 2020-07-24 | End: 2020-07-24

## 2020-07-24 RX ORDER — ALBUTEROL SULFATE 90 UG/1
AEROSOL, METERED RESPIRATORY (INHALATION)
Status: DISCONTINUED | OUTPATIENT
Start: 2020-07-24 | End: 2020-07-24

## 2020-07-24 RX ORDER — IPRATROPIUM BROMIDE AND ALBUTEROL SULFATE 2.5; .5 MG/3ML; MG/3ML
3 SOLUTION RESPIRATORY (INHALATION) EVERY 4 HOURS PRN
Status: ACTIVE | OUTPATIENT
Start: 2020-07-24 | End: 2020-07-25

## 2020-07-24 RX ORDER — MORPHINE SULFATE 4 MG/ML
4 INJECTION, SOLUTION INTRAMUSCULAR; INTRAVENOUS ONCE AS NEEDED
Status: COMPLETED | OUTPATIENT
Start: 2020-07-24 | End: 2020-07-24

## 2020-07-24 RX ORDER — OXYCODONE HYDROCHLORIDE 5 MG/1
5 TABLET ORAL EVERY 4 HOURS PRN
Status: DISCONTINUED | OUTPATIENT
Start: 2020-07-24 | End: 2020-07-28 | Stop reason: HOSPADM

## 2020-07-24 RX ORDER — PROPOFOL 10 MG/ML
5 INJECTION, EMULSION INTRAVENOUS CONTINUOUS
Status: DISCONTINUED | OUTPATIENT
Start: 2020-07-24 | End: 2020-07-27

## 2020-07-24 RX ORDER — SODIUM CHLORIDE 0.9 % (FLUSH) 0.9 %
10 SYRINGE (ML) INJECTION
Status: DISCONTINUED | OUTPATIENT
Start: 2020-07-24 | End: 2020-07-28 | Stop reason: HOSPADM

## 2020-07-24 RX ORDER — NEOSTIGMINE METHYLSULFATE 0.5 MG/ML
INJECTION, SOLUTION INTRAVENOUS
Status: DISCONTINUED | OUTPATIENT
Start: 2020-07-24 | End: 2020-07-24

## 2020-07-24 RX ORDER — NICARDIPINE HYDROCHLORIDE 0.2 MG/ML
1 INJECTION INTRAVENOUS CONTINUOUS
Status: DISCONTINUED | OUTPATIENT
Start: 2020-07-24 | End: 2020-07-27

## 2020-07-24 RX ORDER — MUPIROCIN 20 MG/G
1 OINTMENT TOPICAL
Status: COMPLETED | OUTPATIENT
Start: 2020-07-24 | End: 2020-07-24

## 2020-07-24 RX ORDER — TRANEXAMIC ACID 100 MG/ML
INJECTION, SOLUTION INTRAVENOUS
Status: DISCONTINUED | OUTPATIENT
Start: 2020-07-24 | End: 2020-07-24

## 2020-07-24 RX ORDER — IPRATROPIUM BROMIDE AND ALBUTEROL SULFATE 2.5; .5 MG/3ML; MG/3ML
3 SOLUTION RESPIRATORY (INHALATION) EVERY 4 HOURS
Status: DISPENSED | OUTPATIENT
Start: 2020-07-24 | End: 2020-07-25

## 2020-07-24 RX ORDER — MAGNESIUM SULFATE HEPTAHYDRATE 40 MG/ML
2 INJECTION, SOLUTION INTRAVENOUS
Status: DISCONTINUED | OUTPATIENT
Start: 2020-07-24 | End: 2020-07-28 | Stop reason: HOSPADM

## 2020-07-24 RX ORDER — PROPOFOL 10 MG/ML
VIAL (ML) INTRAVENOUS
Status: DISCONTINUED | OUTPATIENT
Start: 2020-07-24 | End: 2020-07-24

## 2020-07-24 RX ORDER — OXYCODONE HYDROCHLORIDE 10 MG/1
10 TABLET ORAL EVERY 4 HOURS PRN
Status: DISCONTINUED | OUTPATIENT
Start: 2020-07-24 | End: 2020-07-28 | Stop reason: HOSPADM

## 2020-07-24 RX ORDER — POTASSIUM CHLORIDE 14.9 MG/ML
20 INJECTION INTRAVENOUS
Status: DISCONTINUED | OUTPATIENT
Start: 2020-07-24 | End: 2020-07-28 | Stop reason: HOSPADM

## 2020-07-24 RX ORDER — BACITRACIN 50000 [IU]/1
INJECTION, POWDER, FOR SOLUTION INTRAMUSCULAR
Status: DISCONTINUED | OUTPATIENT
Start: 2020-07-24 | End: 2020-07-24

## 2020-07-24 RX ORDER — BISACODYL 10 MG
10 SUPPOSITORY, RECTAL RECTAL EVERY 12 HOURS PRN
Status: DISCONTINUED | OUTPATIENT
Start: 2020-07-24 | End: 2020-07-28 | Stop reason: HOSPADM

## 2020-07-24 RX ORDER — POTASSIUM CHLORIDE 29.8 MG/ML
40 INJECTION INTRAVENOUS
Status: DISCONTINUED | OUTPATIENT
Start: 2020-07-24 | End: 2020-07-28 | Stop reason: HOSPADM

## 2020-07-24 RX ORDER — POLYETHYLENE GLYCOL 3350 17 G/17G
17 POWDER, FOR SOLUTION ORAL DAILY
Status: DISCONTINUED | OUTPATIENT
Start: 2020-07-25 | End: 2020-07-28 | Stop reason: HOSPADM

## 2020-07-24 RX ADMIN — HEPARIN SODIUM 25000 UNITS: 1000 INJECTION, SOLUTION INTRAVENOUS; SUBCUTANEOUS at 08:07

## 2020-07-24 RX ADMIN — NICARDIPINE HYDROCHLORIDE 10 MG/HR: 0.2 INJECTION, SOLUTION INTRAVENOUS at 05:07

## 2020-07-24 RX ADMIN — CEFAZOLIN 2 G: 330 INJECTION, POWDER, FOR SOLUTION INTRAMUSCULAR; INTRAVENOUS at 12:07

## 2020-07-24 RX ADMIN — CEFAZOLIN 2 G: 330 INJECTION, POWDER, FOR SOLUTION INTRAMUSCULAR; INTRAVENOUS at 04:07

## 2020-07-24 RX ADMIN — ROCURONIUM BROMIDE 90 MG: 10 INJECTION, SOLUTION INTRAVENOUS at 07:07

## 2020-07-24 RX ADMIN — KETAMINE HYDROCHLORIDE 10 MG: 10 INJECTION, SOLUTION INTRAMUSCULAR; INTRAVENOUS at 08:07

## 2020-07-24 RX ADMIN — DEXTROSE MONOHYDRATE 50 ML: 25 INJECTION, SOLUTION INTRAVENOUS at 03:07

## 2020-07-24 RX ADMIN — TRANEXAMIC ACID 900 MG: 100 INJECTION, SOLUTION INTRAVENOUS at 07:07

## 2020-07-24 RX ADMIN — TRANEXAMIC ACID 1 MG/KG/HR: 100 INJECTION, SOLUTION INTRAVENOUS at 07:07

## 2020-07-24 RX ADMIN — DOCUSATE SODIUM 100 MG: 100 CAPSULE, LIQUID FILLED ORAL at 09:07

## 2020-07-24 RX ADMIN — NOREPINEPHRINE BITARTRATE 8 MCG: 1 INJECTION, SOLUTION, CONCENTRATE INTRAVENOUS at 08:07

## 2020-07-24 RX ADMIN — METHADONE HYDROCHLORIDE 18 MG: 10 INJECTION, SOLUTION INTRAMUSCULAR; INTRAVENOUS; SUBCUTANEOUS at 07:07

## 2020-07-24 RX ADMIN — HEPARIN SODIUM 5000 UNITS: 1000 INJECTION, SOLUTION INTRAVENOUS; SUBCUTANEOUS at 09:07

## 2020-07-24 RX ADMIN — GLYCOPYRROLATE 0.6 MG: 0.2 INJECTION, SOLUTION INTRAMUSCULAR; INTRAVENOUS at 04:07

## 2020-07-24 RX ADMIN — SODIUM BICARBONATE 100 MEQ: 84 INJECTION, SOLUTION INTRAVENOUS at 06:07

## 2020-07-24 RX ADMIN — ALBUMIN (HUMAN) 500 ML: 12.5 SOLUTION INTRAVENOUS at 04:07

## 2020-07-24 RX ADMIN — PROTAMINE SULFATE 250 MG: 10 INJECTION, SOLUTION INTRAVENOUS at 03:07

## 2020-07-24 RX ADMIN — MUPIROCIN 1 G: 20 OINTMENT TOPICAL at 08:07

## 2020-07-24 RX ADMIN — SODIUM CHLORIDE, SODIUM GLUCONATE, SODIUM ACETATE, POTASSIUM CHLORIDE, MAGNESIUM CHLORIDE, SODIUM PHOSPHATE, DIBASIC, AND POTASSIUM PHOSPHATE: .53; .5; .37; .037; .03; .012; .00082 INJECTION, SOLUTION INTRAVENOUS at 07:07

## 2020-07-24 RX ADMIN — ALBUTEROL SULFATE 10 PUFF: 90 AEROSOL, METERED RESPIRATORY (INHALATION) at 03:07

## 2020-07-24 RX ADMIN — EPINEPHRINE 0.02 MCG/KG/MIN: 1 INJECTION INTRAMUSCULAR; INTRAVENOUS; SUBCUTANEOUS at 11:07

## 2020-07-24 RX ADMIN — HYDROMORPHONE HYDROCHLORIDE 0.5 MG: 1 INJECTION, SOLUTION INTRAMUSCULAR; INTRAVENOUS; SUBCUTANEOUS at 05:07

## 2020-07-24 RX ADMIN — CALCIUM CHLORIDE 0.5 G: 100 INJECTION, SOLUTION INTRAVENOUS at 03:07

## 2020-07-24 RX ADMIN — SODIUM CHLORIDE, SODIUM GLUCONATE, SODIUM ACETATE, POTASSIUM CHLORIDE, MAGNESIUM CHLORIDE, SODIUM PHOSPHATE, DIBASIC, AND POTASSIUM PHOSPHATE: .53; .5; .37; .037; .03; .012; .00082 INJECTION, SOLUTION INTRAVENOUS at 12:07

## 2020-07-24 RX ADMIN — SODIUM CHLORIDE: 0.9 INJECTION, SOLUTION INTRAVENOUS at 06:07

## 2020-07-24 RX ADMIN — NOREPINEPHRINE BITARTRATE 0.02 MCG/KG/MIN: 1 INJECTION, SOLUTION, CONCENTRATE INTRAVENOUS at 07:07

## 2020-07-24 RX ADMIN — INSULIN HUMAN 10 UNITS: 100 INJECTION, SOLUTION PARENTERAL at 03:07

## 2020-07-24 RX ADMIN — NITROGLYCERIN 25 MCG: 5 INJECTION, SOLUTION INTRAVENOUS at 03:07

## 2020-07-24 RX ADMIN — PROPOFOL 100 MG: 10 INJECTION, EMULSION INTRAVENOUS at 07:07

## 2020-07-24 RX ADMIN — SODIUM PHOSPHATE, MONOBASIC, MONOHYDRATE 20.01 MMOL: 276; 142 INJECTION, SOLUTION INTRAVENOUS at 09:07

## 2020-07-24 RX ADMIN — LIDOCAINE HYDROCHLORIDE 100 MG: 20 INJECTION, SOLUTION INTRAVENOUS at 11:07

## 2020-07-24 RX ADMIN — CEFAZOLIN 2 G: 330 INJECTION, POWDER, FOR SOLUTION INTRAMUSCULAR; INTRAVENOUS at 08:07

## 2020-07-24 RX ADMIN — NICARDIPINE HYDROCHLORIDE 7.5 MG/HR: 0.2 INJECTION, SOLUTION INTRAVENOUS at 09:07

## 2020-07-24 RX ADMIN — INSULIN HUMAN 5 UNITS: 100 INJECTION, SOLUTION PARENTERAL at 03:07

## 2020-07-24 RX ADMIN — MUPIROCIN 1 G: 20 OINTMENT TOPICAL at 06:07

## 2020-07-24 RX ADMIN — ROCURONIUM BROMIDE 50 MG: 10 INJECTION, SOLUTION INTRAVENOUS at 08:07

## 2020-07-24 RX ADMIN — CEFAZOLIN 2 G: 1 INJECTION, POWDER, FOR SOLUTION INTRAMUSCULAR; INTRAVENOUS at 05:07

## 2020-07-24 RX ADMIN — MIDAZOLAM HYDROCHLORIDE 2 MG: 1 INJECTION, SOLUTION INTRAMUSCULAR; INTRAVENOUS at 06:07

## 2020-07-24 RX ADMIN — PROTAMINE SULFATE 25 MG: 10 INJECTION, SOLUTION INTRAVENOUS at 08:07

## 2020-07-24 RX ADMIN — FENTANYL CITRATE 150 MCG: 50 INJECTION, SOLUTION INTRAMUSCULAR; INTRAVENOUS at 07:07

## 2020-07-24 RX ADMIN — ONDANSETRON 4 MG: 2 INJECTION INTRAMUSCULAR; INTRAVENOUS at 05:07

## 2020-07-24 RX ADMIN — MORPHINE SULFATE 4 MG: 4 INJECTION INTRAVENOUS at 09:07

## 2020-07-24 RX ADMIN — ROCURONIUM BROMIDE 30 MG: 10 INJECTION, SOLUTION INTRAVENOUS at 01:07

## 2020-07-24 RX ADMIN — SODIUM CHLORIDE 1 UNITS/HR: 9 INJECTION, SOLUTION INTRAVENOUS at 08:07

## 2020-07-24 RX ADMIN — ROCURONIUM BROMIDE 20 MG: 10 INJECTION, SOLUTION INTRAVENOUS at 12:07

## 2020-07-24 RX ADMIN — CALCIUM CHLORIDE 0.5 G: 100 INJECTION, SOLUTION INTRAVENOUS at 11:07

## 2020-07-24 RX ADMIN — DEXTROSE, SODIUM CHLORIDE, AND POTASSIUM CHLORIDE: 5; .45; .15 INJECTION INTRAVENOUS at 05:07

## 2020-07-24 RX ADMIN — ALBUMIN (HUMAN) 25 G: 12.5 SOLUTION INTRAVENOUS at 06:07

## 2020-07-24 RX ADMIN — PROMETHAZINE HYDROCHLORIDE 12.5 MG: 25 INJECTION INTRAMUSCULAR; INTRAVENOUS at 08:07

## 2020-07-24 RX ADMIN — LIDOCAINE HYDROCHLORIDE 100 MG: 20 INJECTION, SOLUTION INTRAVENOUS at 07:07

## 2020-07-24 RX ADMIN — FENTANYL CITRATE 100 MCG: 50 INJECTION, SOLUTION INTRAMUSCULAR; INTRAVENOUS at 03:07

## 2020-07-24 RX ADMIN — KETAMINE HYDROCHLORIDE 30 MG: 10 INJECTION, SOLUTION INTRAMUSCULAR; INTRAVENOUS at 07:07

## 2020-07-24 RX ADMIN — IPRATROPIUM BROMIDE AND ALBUTEROL SULFATE 3 ML: .5; 2.5 SOLUTION RESPIRATORY (INHALATION) at 08:07

## 2020-07-24 RX ADMIN — NEOSTIGMINE METHYLSULFATE 5 MG: 0.5 INJECTION INTRAVENOUS at 04:07

## 2020-07-24 RX ADMIN — METOCLOPRAMIDE 5 MG: 5 INJECTION, SOLUTION INTRAMUSCULAR; INTRAVENOUS at 06:07

## 2020-07-24 RX ADMIN — KETAMINE HYDROCHLORIDE 10 MG: 10 INJECTION, SOLUTION INTRAMUSCULAR; INTRAVENOUS at 09:07

## 2020-07-24 RX ADMIN — FENTANYL CITRATE 50 MCG: 50 INJECTION, SOLUTION INTRAMUSCULAR; INTRAVENOUS at 06:07

## 2020-07-24 NOTE — H&P
[facility]  Critical Care - Surgery  History & Physical    Patient Name: Kamari Merino  MRN: [id number]  Admission Date: 2020  Code Status: Full Code  Attending Physician: Cornel Guzman MD   Primary Care Provider: Primary Doctor No   Principal Problem: S/P mitral valve repair    Subjective:     HPI:Kamari Merino is a 55 y.o. male with a medical history of hypertension who presents to the [facility] clinic for evaluation of severe mitral regurgitation.  Patient reports that the PCP was having problems regulating his blood pressure on medication and sent him to see a cardiologist who diagnosised him with MR on echo.  Clinical symptoms include fatigue, shortness of breath, and palpations. The patient presents to [facility] today to pre-op for     Hospital/ICU Course: Patient was taken to the OR on  for a mitral valve replacement.    COLLETTE: EF of 60%.  Myxomatous mitral valve with prolapse .  No issues with aortic, tricuspid, or pulmonic valve.      Patient was brought to the surgical ICU extubated and in no respiratory distress.  Had some issues with oozing during case requiring one unit of platelets.  Arrived on a cardene drip of 7.5.  Chest tubes in place with sanguinous output.      Follow-up For: Procedure(s) (LRB):  VALVULOPLASTY, MITRAL VALVE (N/A)    Post-Operative Day: Day of Surgery     Past Medical History:   Diagnosis Date    Hypertension        History reviewed. No pertinent surgical history.    Review of patient's allergies indicates:  No Known Allergies    Family History     None        Tobacco Use    Smoking status: Never Smoker   Substance and Sexual Activity    Alcohol Use     Frequency: 2-4 times a month     Drinks per session: 1 or 2     Binge frequency: Never    Drug use: Not on file    Sexual activity: Not on file      Review of Systems   Reason unable to perform ROS: Somnolent from surgery/sedation.     Objective:     Vital Signs (Most Recent):  Temp: 97.8 °F (36.6 °C)  (07/24/20 1645)  Pulse: 77 (07/24/20 1807)  Resp: 15 (07/24/20 1807)  BP: 115/67 (07/24/20 1800)  SpO2: 99 % (07/24/20 1807) Vital Signs (24h Range):  Temp:  [97.8 °F (36.6 °C)] 97.8 °F (36.6 °C)  Pulse:  [74-85] 77  Resp:  [7-30] 15  SpO2:  [90 %-100 %] 99 %  BP: (115-129)/(67-78) 115/67  Arterial Line BP: (105-113)/(55-71) 109/59        There is no height or weight on file to calculate BMI.      Intake/Output Summary (Last 24 hours) at 7/24/2020 1827  Last data filed at 7/24/2020 1800  Gross per 24 hour   Intake 2448 ml   Output 1385 ml   Net 1063 ml       Physical Exam  Constitutional:       Comments: Somnolent   HENT:      Head: Normocephalic.   Eyes:      Extraocular Movements: Extraocular movements intact.   Neck:      Musculoskeletal: Normal range of motion.      Comments: Central line in place  Cardiovascular:      Rate and Rhythm: Normal rate and regular rhythm.      Pulses: Normal pulses.      Comments: Midline sternal incision with clean dressing  Chest tubes in place with sanguinous output  Pulmonary:      Effort: Pulmonary effort is normal. No respiratory distress.      Comments: On face mask  Abdominal:      General: There is no distension.      Palpations: Abdomen is soft.   Musculoskeletal: Normal range of motion.      Comments: Limbs warm to touch   Skin:     General: Skin is warm and dry.   Neurological:      General: No focal deficit present.      Mental Status: He is alert.      Comments: Somnolent from sedation         Vents:  Oxygen Concentration (%): 100 (07/24/20 1645)    Lines/Drains/Airways     Central Venous Catheter Line            Percutaneous Central Line Insertion/Assessment - Double Lumen  07/24/20 0715 less than 1 day          Drain                 Urethral Catheter 07/24/20 0716 Non-latex;Straight-tip;Temperature probe 14 Fr. less than 1 day         Y Chest Tube 1 and 2 07/24/20 1447 1 Anterior Mediastinal 19 Fr. 2 Posterior Mediastinal 19 Fr. less than 1 day          Arterial Line                  Arterial Line 07/24/20 0704 Left Radial less than 1 day          Line                 Pacer Wires 07/24/20 1118 less than 1 day          Peripheral Intravenous Line                 Peripheral IV - Single Lumen 07/24/20 0633 18 G Left Hand less than 1 day                Significant Labs:    CBC/Anemia Profile:  Recent Labs   Lab 07/23/20  1231  07/24/20  1649 07/24/20  1649 07/24/20  1807   WBC 5.87  --  19.81*  --   --    HGB 14.8  --  10.3*  --   --    HCT 46.0   < > 30.4* 28* 34*     --  170  --   --    MCV 94  --  92  --   --    RDW 12.9  --  13.0  --   --     < > = values in this interval not displayed.        Chemistries:  Recent Labs   Lab 07/23/20  1231 07/24/20  1649    142   K 4.3 4.4    114*   CO2 27 20*   BUN 19 17   CREATININE 1.1 1.4   CALCIUM 9.3 7.8*   ALBUMIN 4.0  --    PROT 7.0  --    BILITOT 0.7  --    ALKPHOS 84  --    ALT 35  --    AST 22  --    MG  --  3.1*  3.1*   PHOS  --  1.9*  1.9*       ABGs:   Recent Labs   Lab 07/24/20  1807   PH 7.252*   PCO2 43.2   HCO3 19.1*   POCSATURATED 92*   BE -8     Bilirubin:   Recent Labs   Lab 07/23/20  1231   BILITOT 0.7     BMP:   Recent Labs   Lab 07/24/20  1649   *      K 4.4   *   CO2 20*   BUN 17   CREATININE 1.4   CALCIUM 7.8*   MG 3.1*  3.1*     CMP:   Recent Labs   Lab 07/23/20  1231 07/24/20  1649    142   K 4.3 4.4    114*   CO2 27 20*   * 186*   BUN 19 17   CREATININE 1.1 1.4   CALCIUM 9.3 7.8*   PROT 7.0  --    ALBUMIN 4.0  --    BILITOT 0.7  --    ALKPHOS 84  --    AST 22  --    ALT 35  --    ANIONGAP 7* 8   EGFRNONAA >60.0 56.6*     Cardiac Markers: No results for input(s): CKMB, TROPONINT, MYOGLOBIN in the last 48 hours.  Coagulation:   Recent Labs   Lab 07/24/20  1649   INR 1.1   APTT 33.1*     Lactic Acid: No results for input(s): LACTATE in the last 48 hours.    Significant Imaging: I have reviewed all pertinent imaging results/findings within the past 24  hours.    Assessment/Plan:     Active Diagnoses:    Diagnosis Date Noted POA    PRINCIPAL PROBLEM:  S/P mitral valve repair [Z98.890] 07/24/2020 Not Applicable    Acute hyperglycemia [R73.9] 07/24/2020 No    Obesity (BMI 30-39.9) [E66.9] 07/24/2020 No      Problems Resolved During this Admission:    Diagnosis Date Noted Date Resolved POA    Mitral regurgitation [I34.0] 06/23/2020 07/24/2020 Yes       Neuro:   - Extubated on no sedation  - Oxy 5/10 PRN for pain once passes swallow study, dilaudid PRN    Cardiac:  - MAP goal 60-80  - continue tamiko  - continue CVC  - CT, continue to wall suction, will keep today  - ASA at 11pm and daily    Pulmonary:   - intubated  - daily SBT  - PRN breathing treatments  - daily CXR    Oxygen Concentration (%):  [100] 100    Renal:   - monitor Is and Os  - trend BMP now and daily  - continue coy    - BUN/Cr   BUN, Bld   Date Value Ref Range Status   07/24/2020 17 6 - 20 mg/dL Final   07/23/2020 19 6 - 20 mg/dL Final     Creatinine   Date Value Ref Range Status   07/24/2020 1.4 0.5 - 1.4 mg/dL Final   07/23/2020 1.1 0.5 - 1.4 mg/dL Final       Infectious Disease:   - WBC trending, trend daily  - Post op ancef    Lab Results   Component Value Date    WBC 19.81 (H) 07/24/2020       Hematology/Oncology:  - H/H trending, monitor now and daily  - PTT 33 giving 25 protamine  - ASA daily  - DVT ppx: SCDs    Lab Results   Component Value Date    WBC 19.81 (H) 07/24/2020    HGB 10.3 (L) 07/24/2020    HCT 34 (L) 07/24/2020    MCV 92 07/24/2020     07/24/2020       Endocrine:  - insulin gtt vs SSI as needed  - endocrinology following    F:   Fluids/Electrolytes (From admission, onward)    Start     Stop Route Frequency Ordered    07/24/20 1812  sodium bicarbonate solution 100 mEq      -- IV Once 07/24/20 1812    07/24/20 1630  dextrose 5 % and 0.45 % NaCl with KCl 20 mEq infusion      -- IV Continuous 07/24/20 1623    07/24/20 1622  sodium chloride 0.9% flush 10 mL      -- IV As  needed (PRN) 07/24/20 1623    07/24/20 1622  dextrose 50% injection 12.5 g      -- IV As needed (PRN) 07/24/20 1623    07/24/20 1622  dextrose 50% injection 25 g      -- IV As needed (PRN) 07/24/20 1623    07/24/20 1622  magnesium sulfate 2g in water 50mL IVPB (premix)      -- IV As needed (PRN) 07/24/20 1623    07/24/20 1622  magnesium sulfate 2g in water 50mL IVPB (premix)      -- IV As needed (PRN) 07/24/20 1623    07/24/20 1622  potassium chloride 20 mEq in 100 mL IVPB (FOR CENTRAL LINE ADMINISTRATION ONLY)  (MEDICATIONS - POTASSIUM CHLORIDE IVPB FOR CTS CARDIOTHORACIC SURGERY POST-OP CENTRAL LINE)      -- IV As needed (PRN) 07/24/20 1623    07/24/20 1622  potassium chloride 40 mEq in 100 mL IVPB (FOR CENTRAL LINE ADMINISTRATION ONLY)  (MEDICATIONS - POTASSIUM CHLORIDE IVPB FOR CTS CARDIOTHORACIC SURGERY POST-OP CENTRAL LINE)      -- IV As needed (PRN) 07/24/20 1623    07/24/20 1622  potassium chloride 20 mEq in 100 mL IVPB (FOR CENTRAL LINE ADMINISTRATION ONLY)  (MEDICATIONS - POTASSIUM CHLORIDE IVPB FOR CTS CARDIOTHORACIC SURGERY POST-OP CENTRAL LINE)      -- IV As needed (PRN) 07/24/20 1623         E:   Recent Labs   Lab 07/24/20  1649      K 4.4   *   CO2 20*   BUN 17   CREATININE 1.4   MG 3.1*  3.1*      N: NPO tonight, bedside swallow study, can have PO meds and sips if passed.  Advance diet as tolerated.    GI:   - replace electrolytes as needed to keep K>4, Mg>2  - bowel reg - daily miralax  - protonix for GI prophylaxis    Dispo:  - continue care in the SICU      Critical care was time spent personally by me on the following activities: development of treatment plan with patient or surrogate and bedside caregivers, discussions with consultants, evaluation of patient's response to treatment, examination of patient, ordering and performing treatments and interventions, ordering and review of laboratory studies, ordering and review of radiographic studies, pulse oximetry, re-evaluation of  patient's condition.  This critical care time did not overlap with that of any other provider or involve time for any procedures.     Sonny Olivier MD  Critical Care - Surgery  [facility]

## 2020-07-24 NOTE — INTERVAL H&P NOTE
The patient has been examined and the H&P has been reviewed:    I concur with the findings and no changes have occurred since H&P was written. MV repair vs replace.     Anesthesia/Surgery risks, benefits and alternative options discussed and understood by patient/family.    Active Hospital Problems    Diagnosis  POA    Mitral regurgitation [I34.0]  Yes      Resolved Hospital Problems   No resolved problems to display.

## 2020-07-24 NOTE — ASSESSMENT & PLAN NOTE
BG goal 110-140  CTS protocol     Continue IV insulin infusion protocol  Requires intensive BG monitoring while on protocol     Discharge planning: ALEX

## 2020-07-24 NOTE — ANESTHESIA PROCEDURE NOTES
COLLETTE    Diagnosis: Mitral valve insufficiency, unspecified etiology (I34.0)  Patient location during procedure: OR  Procedure start time: 7/24/2020 7:50 AM  Timeout: 7/24/2020 7:50 AM  Procedure end time: 7/24/2020 8:15 AM  Surgery related to: MVr  Exam type: Baseline  Staffing  Anesthesiologist: Lasha Segura MD  Performed: anesthesiologist   Preanesthetic Checklist  Completed: patient identified, surgical consent, pre-op evaluation, timeout performed, risks and benefits discussed, monitors and equipment checked, anesthesia consent given, oxygen available, suction available, hand hygiene performed and patient being monitored  Setup & Induction  Patient preparation: bite block inserted  Probe Insertion: easy  Exam: complete      Findings  Impression  Other Findings  Normal biventricular function (LVEF 60%)  Myxomatous appearing mitral valve, prolapsed anterior and posterior leaflets the most significant of which were P2 and A3 with associated jets.   No flail segments or ruptured chords noted  All other valves with normal structure and function  RA Chiari network noted.   No PFO.     Probe Removal      Exam     Left Heart  Left Atruim: dilated and severly enlarged (men >5.2; women >4.7)  Left appendage velocity:100    Left Ventricle: 5.5 cm, normal (men 4.2-5.9; women 3.8-5.2)  LV Wall Thickness (posterior wall): cm, normal (men 0.6-1.0 cm; women 0.6-0.9 cm)    LVAD:no  Estimated Ejection Fraction: > 55% normal  Regional Wall Abnormalities: no RWMA            Right Heart  Right Ventricle: normal  Right Ventricle Function: normal  Right Atria: cm and normal, chiari network    Intra Atrial Septum  PFO: no shunt by color flow doppler  no IAS aneurysm  no lipomatous hypertrophy  no Atrial Septal Defect (Asd)    Right Ventricle  Size: normal    Aortic Valve:  Stenosis: none  Morphology: trileaflet  Regurgitation: no aortic valve regurgitation     Mitral Valve:  Morphology:myxomatous  Prolapse: P2 and A3  Jet  Description: moderate    Tricuspid Valve:  Morphology: normal  Regurgitation: none    Pulmonic Valve:  Morphology:normal  Regurgitation(color flow): none    Great Vessels  Ascending Aorta Atherosclerosis: 2=mild dz (<3mm)  Aortic Arch Atherosclerosis: 2=mild dz (<3mm)  IABP: no  Descending Aorta Atherosclerosis: 2=mild dz (<3mm)  Aorta    Descending aorta IABP: no    Effusions  Effusions: none    Summary  Findings discussed with surgeon.    Other Findings   Normal biventricular function (LVEF 60%)  Myxomatous appearing mitral valve, prolapsed anterior and posterior leaflets the most significant of which were P2 and A3 with associated jets.   No flail segments or ruptured chords noted  All other valves with normal structure and function  RA Chiari network noted.   No PFO.

## 2020-07-24 NOTE — ASSESSMENT & PLAN NOTE
S/p MVr on 7/24/20  Managed per primary team  Avoid hypoglycemia  Optimize BG control for surgical wound healing

## 2020-07-24 NOTE — TRANSFER OF CARE
Anesthesia Transfer of Care Note    Patient: Kamari Merino    Procedure(s) Performed: Procedure(s) (LRB):  VALVULOPLASTY, MITRAL VALVE (N/A)    Patient location: ICU    Anesthesia Type: general    Transport from OR: Transported from OR on 6-10 L/min O2 by face mask with adequate spontaneous ventilation    Post pain: adequate analgesia    Post assessment: no apparent anesthetic complications    Post vital signs: stable    Level of consciousness: awake    Nausea/Vomiting: no nausea/vomiting    Complications: none    Transfer of care protocol was followed      Last vitals:   Visit Vitals  /78

## 2020-07-24 NOTE — SUBJECTIVE & OBJECTIVE
Interval HPI:   Overnight events: Received in ICU extubated.  BG well controlled without insulin.  K shifted with insulin and dextrose this afternoon.  Intensive insulin protocol initiated upon arrival to ICU.  Eating:   NPO  Nausea: No  Hypoglycemia and intervention: No  Fever: No  TPN and/or TF: No    PMH, PSH, FH, SH reviewed     ROS:  Unable to obtain due to: Sedation,Intubation,Altered mental status,Critical illness,Reviewed ROS from note dated 7/23/20 by Roxy Ramirez NP.        Current Medications and/or Treatments Impacting Glycemic Control  Immunotherapy:    Immunosuppressants     None        Steroids:   Hormones (From admission, onward)    None        Pressors:    Autonomic Drugs (From admission, onward)    Start     Stop Route Frequency Ordered    07/24/20 1630  EPINEPHrine (ADRENALIN) 5 mg in sodium chloride 0.9% 250 mL infusion     Question Answer Comment   Titrate by: (in mcg/kg/min) 0.02    Titrate interval: (in minutes) 5    Titrate to maintain: (SBP or MAP or Cardiac Index) MAP    Greater than: (in mmHg) 65    Cardiac index greater than: (in L/min) 2.2    Maximum dose: (in mcg/kg/min) 2        -- IV Continuous 07/24/20 1623        Hyperglycemia/Diabetes Medications:   Antihyperglycemics (From admission, onward)    Start     Stop Route Frequency Ordered    07/24/20 1630  insulin regular 100 Units in sodium chloride 0.9% 100 mL infusion     Question:  Insulin rate changes (DO NOT MODIFY ANSWER)  Answer:  \\[email or web address]\epic\Images\Pharmacy\InsulinInfusions\CTS INSULIN [id number].pdf    -- IV Continuous 07/24/20 1623             PHYSICAL EXAMINATION:  Vitals:    07/24/20 1649   BP:    Pulse: 82   Resp: (!) 7     There is no height or weight on file to calculate BMI.    Physical Exam     Constitutional: Well developed, well nourished, NAD.  Lethargic.  ENT: External ears no masses with nose patent  Neck: Supple; trachea midline  Cardiovascular: Normal heart sounds, no LE edema. DP +2  bilaterally.  Lungs: Normal effort; lungs anterior bilaterally clear to auscultation.  Abdomen: Soft, no masses, no hernias.  Hypoactive BS noted.  MS: No clubbing or cyanosis of nails noted; unable to assess gait.  Skin: No rashes, lesions, or ulcers; no nodules.  Mid-sternal incision with telfa island dressing, CDI.  CT x 1.  Psychiatric: ELLIOTT  Neurological: Cranial nerves are grossly intact.  Foot: Nails in good condition, no amputations noted.

## 2020-07-24 NOTE — ANESTHESIA PROCEDURE NOTES
Central Line    Diagnosis: mitral regurgitation  Patient location during procedure: done in OR  Procedure start time: 7/24/2020 7:15 AM  Timeout: 7/24/2020 7:14 AM  Procedure end time: 7/24/2020 7:20 AM    Staffing  Authorizing Provider: Alexys Lorenzo MD  Performing Provider: Alexys Lorenzo MD    Staffing  Anesthesiologist: Lasha Segura MD  Resident/CRNA: Alexys Lorenzo MD  Performed: resident/CRNA   Anesthesiologist was present at the time of the procedure.  Preanesthetic Checklist  Completed: patient identified, site marked, surgical consent, pre-op evaluation, timeout performed, IV checked, risks and benefits discussed, monitors and equipment checked and anesthesia consent given  Indication   Indication: hemodynamic monitoring, vascular access     Anesthesia   general anesthesia    Central Line   Skin Prep: skin prepped with ChloraPrep, skin prep agent completely dried prior to procedure  maximum sterile barriers used during central venous catheter insertion  hand hygiene performed prior to central venous catheter insertion  Location: right, internal jugular.   Catheter type: double lumen  Catheter Size: 9 Fr  Inserted Catheter Length: 11.5 cm  Ultrasound: vascular probe with ultrasound  Vessel Caliber: patent  Needle advanced into vessel with real time Ultrasound guidance.  Image recorded and saved.   Manometry: Venous cannualation confirmed by visual estimation of blood vessel pressure using manometry.  Insertion Attempts: 1   Securement:line sutured, chlorhexidine patch, sterile dressing applied and blood return through all ports    Post-Procedure   Adverse Events:none    Guidewire Guidewire removed intact. Guidewire removed intact, verified with nurse.

## 2020-07-24 NOTE — ANESTHESIA PROCEDURE NOTES
TTP Block    Patient location during procedure: OR   Block not for primary anesthetic.  Reason for block: at surgeon's request and post-op pain management   Post-op Pain Location: chest  Start time: 7/24/2020 7:22 AM   End time: 7/24/2020 7:27 AM    Staffing  Authorizing Provider: Lasha Segura MD  Performing Provider: Alexys Lorenzo MD    Preanesthetic Checklist  Completed: patient identified, site marked, surgical consent, pre-op evaluation, timeout performed, IV checked, risks and benefits discussed and monitors and equipment checked  Peripheral Block  Patient position: supine  Prep: ChloraPrep  Patient monitoring: heart rate, continuous pulse ox, continuous capnometry and frequent blood pressure checks  Block type: Transversus thoracis  Laterality: bilateral  Injection technique: single shot  Needle  Needle type: Stimuplex   Needle gauge: 22 G  Needle length: 4 in  Needle localization: ultrasound guidance   -ultrasound image captured on disc.  Assessment  Injection assessment: negative aspiration  Heart rate change: no  Slow fractionated injection: yes

## 2020-07-24 NOTE — ANESTHESIA PROCEDURE NOTES
Intubation  Performed by: Alexys Lorenzo MD  Authorized by: Alexys Lorenzo MD     Intubation:     Induction:  Intravenous    Intubated:  Postinduction    Mask Ventilation:  Easy mask    Attempts:  1    Attempted By:  Staff anesthesiologist    Method of Intubation:  Direct    Blade:  Alcantar 2    Laryngeal View Grade: Grade I - full view of chords      Difficult Airway Encountered?: No      Complications:  None    Airway Device:  Oral endotracheal tube    Airway Device Size:  7.5    Style/Cuff Inflation:  Cuffed    Inflation Amount (mL):  5    Tube secured:  22    Placement Verified By:  Capnometry    Complicating Factors:  None    Findings Post-Intubation:  BS equal bilateral and atraumatic/condition of teeth unchanged

## 2020-07-24 NOTE — HPI
Reason for Consult: Management of Hyperglycemia     Surgical Procedure and Date: Mitral valve valvuloplasty 7/24/20    Lab Results   Component Value Date    HGBA1C 5.2 07/23/2020       HPI:   Patient is a 55 y.o. male with a diagnosis of HTN and mitral regurgitation, who is s/p mitral valve valvuloplasty on 7/24/20.  No personal history of DM.  Endocrinology consulted for BG management.

## 2020-07-24 NOTE — CONSULTS
[facility]  Endocrinology  Diabetes Consult Note    Consult Requested by: Cornel Guzman MD   Reason for admit: S/P mitral valve repair    HISTORY OF PRESENT ILLNESS:  Reason for Consult: Management of Hyperglycemia     Surgical Procedure and Date: Mitral valve valvuloplasty 7/24/20    Lab Results   Component Value Date    HGBA1C 5.2 07/23/2020       HPI:   Patient is a 55 y.o. male with a diagnosis of HTN and mitral regurgitation, who is s/p mitral valve valvuloplasty on 7/24/20.  No personal history of DM.  Endocrinology consulted for BG management.            Interval HPI:   Overnight events: Received in ICU extubated.  BG well controlled without insulin.  K shifted with insulin and dextrose this afternoon.  Intensive insulin protocol initiated upon arrival to ICU.  Eating:   NPO  Nausea: No  Hypoglycemia and intervention: No  Fever: No  TPN and/or TF: No    PMH, PSH, FH, SH reviewed     ROS:  Unable to obtain due to: Sedation,Intubation,Altered mental status,Critical illness,Reviewed ROS from note dated 7/23/20 by Roxy Ramirez NP.        Current Medications and/or Treatments Impacting Glycemic Control  Immunotherapy:    Immunosuppressants     None        Steroids:   Hormones (From admission, onward)    None        Pressors:    Autonomic Drugs (From admission, onward)    Start     Stop Route Frequency Ordered    07/24/20 1630  EPINEPHrine (ADRENALIN) 5 mg in sodium chloride 0.9% 250 mL infusion     Question Answer Comment   Titrate by: (in mcg/kg/min) 0.02    Titrate interval: (in minutes) 5    Titrate to maintain: (SBP or MAP or Cardiac Index) MAP    Greater than: (in mmHg) 65    Cardiac index greater than: (in L/min) 2.2    Maximum dose: (in mcg/kg/min) 2        -- IV Continuous 07/24/20 1623        Hyperglycemia/Diabetes Medications:   Antihyperglycemics (From admission, onward)    Start     Stop Route Frequency Ordered    07/24/20 1630  insulin regular 100 Units in sodium  chloride 0.9% 100 mL infusion     Question:  Insulin rate changes (DO NOT MODIFY ANSWER)  Answer:  \\[email or web address]\epic\Images\Pharmacy\InsulinInfusions\CTS INSULIN [id number].pdf    -- IV Continuous 07/24/20 1623             PHYSICAL EXAMINATION:  Vitals:    07/24/20 1649   BP:    Pulse: 82   Resp: (!) 7     There is no height or weight on file to calculate BMI.    Physical Exam     Constitutional: Well developed, well nourished, NAD.  Lethargic.  ENT: External ears no masses with nose patent  Neck: Supple; trachea midline  Cardiovascular: Normal heart sounds, no LE edema. DP +2 bilaterally.  Lungs: Normal effort; lungs anterior bilaterally clear to auscultation.  Abdomen: Soft, no masses, no hernias.  Hypoactive BS noted.  MS: No clubbing or cyanosis of nails noted; unable to assess gait.  Skin: No rashes, lesions, or ulcers; no nodules.  Mid-sternal incision with telfa island dressing, CDI.  CT x 1.  Psychiatric: ELLIOTT  Neurological: Cranial nerves are grossly intact.  Foot: Nails in good condition, no amputations noted.      Labs Reviewed and Include   No results for input(s): GLU, CALCIUM, ALBUMIN, PROT, NA, K, CO2, CL, BUN, CREATININE, ALKPHOS, ALT, AST, BILITOT in the last 24 hours.  Lab Results   Component Value Date    WBC 19.81 (H) 07/24/2020    HGB 10.3 (L) 07/24/2020    HCT 28 (L) 07/24/2020    MCV 92 07/24/2020     07/24/2020     No results for input(s): TSH, FREET4 in the last 168 hours.  Lab Results   Component Value Date    HGBA1C 5.2 07/23/2020       Nutritional status:   There is no height or weight on file to calculate BMI.  Lab Results   Component Value Date    ALBUMIN 4.0 07/23/2020     No results found for: PREALBUMIN    CrCl cannot be calculated (Unknown ideal weight.).    Accu-Checks  No results for input(s): POCTGLUCOSE in the last 72 hours.     ASSESSMENT and PLAN    * S/P mitral valve repair  S/p MVr on 7/24/20  Managed per primary team  Avoid hypoglycemia  Optimize BG control for surgical  wound healing        Acute hyperglycemia  BG goal 110-140  CTS protocol     Continue IV insulin infusion protocol  Requires intensive BG monitoring while on protocol     Discharge planning: TBD      Obesity (BMI 30-39.9)  may contribute to insulin resistance          Plan discussed with RN at bedside.     Brijesh Ding NP  Endocrinology  [facility]

## 2020-07-24 NOTE — ANESTHESIA PROCEDURE NOTES
Arterial    Diagnosis: mitral regurgitation    Patient location during procedure: done in OR  Procedure start time: 7/24/2020 7:04 AM  Timeout: 7/24/2020 7:03 AM  Procedure end time: 7/24/2020 7:06 AM    Staffing  Authorizing Provider: Lasha Segura MD  Performing Provider: Alexys Lorenzo MD    Anesthesiologist was present at the time of the procedure.    Preanesthetic Checklist  Completed: patient identified, site marked, surgical consent, pre-op evaluation, timeout performed, IV checked, risks and benefits discussed, monitors and equipment checked and anesthesia consent givenArterial  Skin Prep: chlorhexidine gluconate  Local Infiltration: lidocaine  Orientation: left  Location: radial  Catheter Size: 20 G  Catheter placement by Anatomical landmarks. Heme positive aspiration all ports.Insertion Attempts: 1  Assessment  Dressing: secured with tape and tegaderm  Patient: Tolerated well

## 2020-07-25 PROBLEM — I34.0 MITRAL REGURGITATION: Status: ACTIVE | Noted: 2020-07-25

## 2020-07-25 LAB
ALLENS TEST: ABNORMAL
ALLENS TEST: ABNORMAL
ANION GAP SERPL CALC-SCNC: 6 MMOL/L (ref 8–16)
ANION GAP SERPL CALC-SCNC: 7 MMOL/L (ref 8–16)
APTT BLDCRRT: 28.4 SEC (ref 21–32)
BASOPHILS # BLD AUTO: 0.01 K/UL (ref 0–0.2)
BASOPHILS NFR BLD: 0.1 % (ref 0–1.9)
BUN SERPL-MCNC: 13 MG/DL (ref 6–20)
BUN SERPL-MCNC: 16 MG/DL (ref 6–20)
CALCIUM SERPL-MCNC: 7.8 MG/DL (ref 8.7–10.5)
CALCIUM SERPL-MCNC: 8.1 MG/DL (ref 8.7–10.5)
CHLORIDE SERPL-SCNC: 104 MMOL/L (ref 95–110)
CHLORIDE SERPL-SCNC: 111 MMOL/L (ref 95–110)
CO2 SERPL-SCNC: 24 MMOL/L (ref 23–29)
CO2 SERPL-SCNC: 27 MMOL/L (ref 23–29)
CREAT SERPL-MCNC: 0.9 MG/DL (ref 0.5–1.4)
CREAT SERPL-MCNC: 1.1 MG/DL (ref 0.5–1.4)
DELSYS: ABNORMAL
DIFFERENTIAL METHOD: ABNORMAL
EOSINOPHIL # BLD AUTO: 0 K/UL (ref 0–0.5)
EOSINOPHIL NFR BLD: 0 % (ref 0–8)
ERYTHROCYTE [DISTWIDTH] IN BLOOD BY AUTOMATED COUNT: 13.2 % (ref 11.5–14.5)
EST. GFR  (AFRICAN AMERICAN): >60 ML/MIN/1.73 M^2
EST. GFR  (AFRICAN AMERICAN): >60 ML/MIN/1.73 M^2
EST. GFR  (NON AFRICAN AMERICAN): >60 ML/MIN/1.73 M^2
EST. GFR  (NON AFRICAN AMERICAN): >60 ML/MIN/1.73 M^2
FLOW: 2
GLUCOSE SERPL-MCNC: 117 MG/DL (ref 70–110)
GLUCOSE SERPL-MCNC: 149 MG/DL (ref 70–110)
HCO3 UR-SCNC: 23.8 MMOL/L (ref 24–28)
HCO3 UR-SCNC: 27.8 MMOL/L (ref 24–28)
HCT VFR BLD AUTO: 30.4 % (ref 40–54)
HGB BLD-MCNC: 10.2 G/DL (ref 14–18)
IMM GRANULOCYTES # BLD AUTO: 0.08 K/UL (ref 0–0.04)
IMM GRANULOCYTES NFR BLD AUTO: 0.6 % (ref 0–0.5)
INR PPP: 1.1 (ref 0.8–1.2)
LYMPHOCYTES # BLD AUTO: 1 K/UL (ref 1–4.8)
LYMPHOCYTES NFR BLD: 7.1 % (ref 18–48)
MAGNESIUM SERPL-MCNC: 2.2 MG/DL (ref 1.6–2.6)
MCH RBC QN AUTO: 31.3 PG (ref 27–31)
MCHC RBC AUTO-ENTMCNC: 33.6 G/DL (ref 32–36)
MCV RBC AUTO: 93 FL (ref 82–98)
MODE: ABNORMAL
MONOCYTES # BLD AUTO: 1 K/UL (ref 0.3–1)
MONOCYTES NFR BLD: 7.2 % (ref 4–15)
NEUTROPHILS # BLD AUTO: 11.9 K/UL (ref 1.8–7.7)
NEUTROPHILS NFR BLD: 85 % (ref 38–73)
NRBC BLD-RTO: 0 /100 WBC
PCO2 BLDA: 30.8 MMHG (ref 35–45)
PCO2 BLDA: 39.3 MMHG (ref 35–45)
PH SMN: 7.46 [PH] (ref 7.35–7.45)
PH SMN: 7.5 [PH] (ref 7.35–7.45)
PHOSPHATE SERPL-MCNC: 2 MG/DL (ref 2.7–4.5)
PLATELET # BLD AUTO: 124 K/UL (ref 150–350)
PMV BLD AUTO: 11.7 FL (ref 9.2–12.9)
PO2 BLDA: 67 MMHG (ref 80–100)
PO2 BLDA: 71 MMHG (ref 80–100)
POC BE: 1 MMOL/L
POC BE: 4 MMOL/L
POC SATURATED O2: 94 % (ref 95–100)
POC SATURATED O2: 96 % (ref 95–100)
POC TCO2: 25 MMOL/L (ref 23–27)
POC TCO2: 29 MMOL/L (ref 23–27)
POCT GLUCOSE: 113 MG/DL (ref 70–110)
POCT GLUCOSE: 120 MG/DL (ref 70–110)
POCT GLUCOSE: 121 MG/DL (ref 70–110)
POCT GLUCOSE: 123 MG/DL (ref 70–110)
POCT GLUCOSE: 135 MG/DL (ref 70–110)
POCT GLUCOSE: 136 MG/DL (ref 70–110)
POCT GLUCOSE: 136 MG/DL (ref 70–110)
POCT GLUCOSE: 138 MG/DL (ref 70–110)
POCT GLUCOSE: 146 MG/DL (ref 70–110)
POCT GLUCOSE: 150 MG/DL (ref 70–110)
POCT GLUCOSE: 152 MG/DL (ref 70–110)
POCT GLUCOSE: 166 MG/DL (ref 70–110)
POTASSIUM SERPL-SCNC: 3.4 MMOL/L (ref 3.5–5.1)
POTASSIUM SERPL-SCNC: 3.7 MMOL/L (ref 3.5–5.1)
POTASSIUM SERPL-SCNC: 4 MMOL/L (ref 3.5–5.1)
PROTHROMBIN TIME: 11.8 SEC (ref 9–12.5)
RBC # BLD AUTO: 3.26 M/UL (ref 4.6–6.2)
SAMPLE: ABNORMAL
SAMPLE: ABNORMAL
SITE: ABNORMAL
SITE: ABNORMAL
SODIUM SERPL-SCNC: 137 MMOL/L (ref 136–145)
SODIUM SERPL-SCNC: 142 MMOL/L (ref 136–145)
WBC # BLD AUTO: 14.02 K/UL (ref 3.9–12.7)

## 2020-07-25 PROCEDURE — 99233 PR SUBSEQUENT HOSPITAL CARE,LEVL III: ICD-10-PCS | Mod: ,,, | Performed by: SURGERY

## 2020-07-25 PROCEDURE — 25000003 PHARM REV CODE 250: Performed by: NURSE PRACTITIONER

## 2020-07-25 PROCEDURE — 85025 COMPLETE CBC W/AUTO DIFF WBC: CPT

## 2020-07-25 PROCEDURE — 25000242 PHARM REV CODE 250 ALT 637 W/ HCPCS: Performed by: NURSE PRACTITIONER

## 2020-07-25 PROCEDURE — 84132 ASSAY OF SERUM POTASSIUM: CPT

## 2020-07-25 PROCEDURE — 85610 PROTHROMBIN TIME: CPT

## 2020-07-25 PROCEDURE — 99232 SBSQ HOSP IP/OBS MODERATE 35: CPT | Mod: ,,, | Performed by: NURSE PRACTITIONER

## 2020-07-25 PROCEDURE — 80048 BASIC METABOLIC PNL TOTAL CA: CPT

## 2020-07-25 PROCEDURE — C9113 INJ PANTOPRAZOLE SODIUM, VIA: HCPCS | Performed by: NURSE PRACTITIONER

## 2020-07-25 PROCEDURE — 84100 ASSAY OF PHOSPHORUS: CPT

## 2020-07-25 PROCEDURE — 20000000 HC ICU ROOM

## 2020-07-25 PROCEDURE — 80048 BASIC METABOLIC PNL TOTAL CA: CPT | Mod: 91

## 2020-07-25 PROCEDURE — 94799 UNLISTED PULMONARY SVC/PX: CPT

## 2020-07-25 PROCEDURE — 63600175 PHARM REV CODE 636 W HCPCS: Performed by: NURSE PRACTITIONER

## 2020-07-25 PROCEDURE — 27000221 HC OXYGEN, UP TO 24 HOURS

## 2020-07-25 PROCEDURE — 99232 PR SUBSEQUENT HOSPITAL CARE,LEVL II: ICD-10-PCS | Mod: ,,, | Performed by: NURSE PRACTITIONER

## 2020-07-25 PROCEDURE — 99233 SBSQ HOSP IP/OBS HIGH 50: CPT | Mod: ,,, | Performed by: SURGERY

## 2020-07-25 PROCEDURE — 25000003 PHARM REV CODE 250: Performed by: STUDENT IN AN ORGANIZED HEALTH CARE EDUCATION/TRAINING PROGRAM

## 2020-07-25 PROCEDURE — 94761 N-INVAS EAR/PLS OXIMETRY MLT: CPT

## 2020-07-25 PROCEDURE — 99900035 HC TECH TIME PER 15 MIN (STAT)

## 2020-07-25 PROCEDURE — 37799 UNLISTED PX VASCULAR SURGERY: CPT

## 2020-07-25 PROCEDURE — 85730 THROMBOPLASTIN TIME PARTIAL: CPT

## 2020-07-25 PROCEDURE — 94640 AIRWAY INHALATION TREATMENT: CPT

## 2020-07-25 PROCEDURE — 82803 BLOOD GASES ANY COMBINATION: CPT

## 2020-07-25 PROCEDURE — 83735 ASSAY OF MAGNESIUM: CPT

## 2020-07-25 PROCEDURE — 97165 OT EVAL LOW COMPLEX 30 MIN: CPT

## 2020-07-25 PROCEDURE — 63600175 PHARM REV CODE 636 W HCPCS: Performed by: STUDENT IN AN ORGANIZED HEALTH CARE EDUCATION/TRAINING PROGRAM

## 2020-07-25 RX ORDER — INSULIN ASPART 100 [IU]/ML
0-10 INJECTION, SOLUTION INTRAVENOUS; SUBCUTANEOUS
Status: DISCONTINUED | OUTPATIENT
Start: 2020-07-25 | End: 2020-07-25

## 2020-07-25 RX ORDER — GLUCAGON 1 MG
1 KIT INJECTION
Status: DISCONTINUED | OUTPATIENT
Start: 2020-07-25 | End: 2020-07-25

## 2020-07-25 RX ORDER — IBUPROFEN 200 MG
24 TABLET ORAL
Status: DISCONTINUED | OUTPATIENT
Start: 2020-07-25 | End: 2020-07-26

## 2020-07-25 RX ORDER — IBUPROFEN 200 MG
16 TABLET ORAL
Status: DISCONTINUED | OUTPATIENT
Start: 2020-07-25 | End: 2020-07-25

## 2020-07-25 RX ORDER — INSULIN ASPART 100 [IU]/ML
1-10 INJECTION, SOLUTION INTRAVENOUS; SUBCUTANEOUS
Status: DISCONTINUED | OUTPATIENT
Start: 2020-07-25 | End: 2020-07-26

## 2020-07-25 RX ORDER — GLUCAGON 1 MG
1 KIT INJECTION
Status: DISCONTINUED | OUTPATIENT
Start: 2020-07-25 | End: 2020-07-26

## 2020-07-25 RX ORDER — FUROSEMIDE 10 MG/ML
40 INJECTION INTRAMUSCULAR; INTRAVENOUS 2 TIMES DAILY
Status: DISCONTINUED | OUTPATIENT
Start: 2020-07-25 | End: 2020-07-28 | Stop reason: HOSPADM

## 2020-07-25 RX ORDER — IBUPROFEN 200 MG
24 TABLET ORAL
Status: DISCONTINUED | OUTPATIENT
Start: 2020-07-25 | End: 2020-07-25

## 2020-07-25 RX ORDER — IBUPROFEN 200 MG
16 TABLET ORAL
Status: DISCONTINUED | OUTPATIENT
Start: 2020-07-25 | End: 2020-07-26

## 2020-07-25 RX ADMIN — OXYCODONE HYDROCHLORIDE 10 MG: 10 TABLET ORAL at 03:07

## 2020-07-25 RX ADMIN — SODIUM PHOSPHATE, MONOBASIC, MONOHYDRATE 20.01 MMOL: 276; 142 INJECTION, SOLUTION INTRAVENOUS at 05:07

## 2020-07-25 RX ADMIN — DOCUSATE SODIUM 100 MG: 100 CAPSULE, LIQUID FILLED ORAL at 08:07

## 2020-07-25 RX ADMIN — IPRATROPIUM BROMIDE AND ALBUTEROL SULFATE 3 ML: .5; 2.5 SOLUTION RESPIRATORY (INHALATION) at 12:07

## 2020-07-25 RX ADMIN — IPRATROPIUM BROMIDE AND ALBUTEROL SULFATE 3 ML: .5; 2.5 SOLUTION RESPIRATORY (INHALATION) at 08:07

## 2020-07-25 RX ADMIN — OXYCODONE HYDROCHLORIDE 10 MG: 10 TABLET ORAL at 08:07

## 2020-07-25 RX ADMIN — CEFAZOLIN 2 G: 1 INJECTION, POWDER, FOR SOLUTION INTRAMUSCULAR; INTRAVENOUS at 08:07

## 2020-07-25 RX ADMIN — MUPIROCIN 1 G: 20 OINTMENT TOPICAL at 08:07

## 2020-07-25 RX ADMIN — NICARDIPINE HYDROCHLORIDE 5 MG/HR: 0.2 INJECTION, SOLUTION INTRAVENOUS at 04:07

## 2020-07-25 RX ADMIN — ASPIRIN 325 MG ORAL TABLET 325 MG: 325 PILL ORAL at 08:07

## 2020-07-25 RX ADMIN — FUROSEMIDE 40 MG: 10 INJECTION, SOLUTION INTRAVENOUS at 08:07

## 2020-07-25 RX ADMIN — POLYETHYLENE GLYCOL 3350 17 G: 17 POWDER, FOR SOLUTION ORAL at 08:07

## 2020-07-25 RX ADMIN — CEFAZOLIN 2 G: 1 INJECTION, POWDER, FOR SOLUTION INTRAMUSCULAR; INTRAVENOUS at 05:07

## 2020-07-25 RX ADMIN — IPRATROPIUM BROMIDE AND ALBUTEROL SULFATE 3 ML: .5; 2.5 SOLUTION RESPIRATORY (INHALATION) at 04:07

## 2020-07-25 RX ADMIN — CEFAZOLIN 2 G: 1 INJECTION, POWDER, FOR SOLUTION INTRAMUSCULAR; INTRAVENOUS at 12:07

## 2020-07-25 RX ADMIN — FUROSEMIDE 40 MG: 10 INJECTION, SOLUTION INTRAVENOUS at 02:07

## 2020-07-25 RX ADMIN — POTASSIUM CHLORIDE 40 MEQ: 400 INJECTION, SOLUTION INTRAVENOUS at 09:07

## 2020-07-25 RX ADMIN — OXYCODONE 5 MG: 5 TABLET ORAL at 03:07

## 2020-07-25 RX ADMIN — PANTOPRAZOLE SODIUM 40 MG: 40 INJECTION, POWDER, LYOPHILIZED, FOR SOLUTION INTRAVENOUS at 08:07

## 2020-07-25 RX ADMIN — IPRATROPIUM BROMIDE AND ALBUTEROL SULFATE 3 ML: .5; 2.5 SOLUTION RESPIRATORY (INHALATION) at 11:07

## 2020-07-25 NOTE — SUBJECTIVE & OBJECTIVE
Interval HPI:   Overnight events: Remains in ICU.  BG well controlled overnight on intensive insulin protocol, rates ranging from 0.6-1.2 u/hr.  Diet advanced.  Eatin% - clears  Nausea: No  Hypoglycemia and intervention: No  Fever: No  TPN and/or TF: No    BP (!) 105/59 (BP Location: Right arm, Patient Position: Lying)   Pulse 72   Temp 99.9 °F (37.7 °C) (Oral)   Resp 15   Wt 98.1 kg (216 lb 4.3 oz)   SpO2 95%   BMI 34.91 kg/m²     Labs Reviewed and Include    Recent Labs   Lab 20  0314   *   CALCIUM 7.8*      K 4.0   CO2 24   *   BUN 16   CREATININE 1.1     Lab Results   Component Value Date    WBC 14.02 (H) 2020    HGB 10.2 (L) 2020    HCT 30.4 (L) 2020    MCV 93 2020     (L) 2020     No results for input(s): TSH, FREET4 in the last 168 hours.  Lab Results   Component Value Date    HGBA1C 5.2 2020       Nutritional status:   Body mass index is 34.91 kg/m².  Lab Results   Component Value Date    ALBUMIN 4.0 2020     No results found for: PREALBUMIN    Estimated Creatinine Clearance: 84.2 mL/min (based on SCr of 1.1 mg/dL).    Accu-Checks  Recent Labs     20  2200 20  2312 20  0010 20  0103 20  0205 20  0313 20  0417 20  0518 20  0613 20  0718   POCTGLUCOSE 196* 153* 166* 136* 136* 152* 150* 135* 138* 146*       Current Medications and/or Treatments Impacting Glycemic Control  Immunotherapy:    Immunosuppressants     None        Steroids:   Hormones (From admission, onward)    None        Pressors:    Autonomic Drugs (From admission, onward)    Start     Stop Route Frequency Ordered    20 1630  EPINEPHrine (ADRENALIN) 5 mg in sodium chloride 0.9% 250 mL infusion     Question Answer Comment   Titrate by: (in mcg/kg/min) 0.02    Titrate interval: (in minutes) 5    Titrate to maintain: (SBP or MAP or Cardiac Index) MAP    Greater than: (in mmHg) 65    Cardiac index  greater than: (in L/min) 2.2    Maximum dose: (in mcg/kg/min) 2        -- IV Continuous 07/24/20 1623        Hyperglycemia/Diabetes Medications:   Antihyperglycemics (From admission, onward)    Start     Stop Route Frequency Ordered    07/24/20 1630  insulin regular 100 Units in sodium chloride 0.9% 100 mL infusion     Question:  Insulin rate changes (DO NOT MODIFY ANSWER)  Answer:  \\[email or web address]\epic\Images\Pharmacy\InsulinInfusions\CTS INSULIN [id number].pdf    -- IV Continuous 07/24/20 1625

## 2020-07-25 NOTE — PLAN OF CARE
Shift events: OOBTC today x6 hours.     Vitals: Tmax 100.2. HR NSR 70s-80s. MAPs maintained 60-80.    Neuro: AAOx4. Follows commands.    Cardiac: Cardene off today. NSR 70-80s.    Respiratory: O2 weaned to RA, SpO2 %    Drains: Meds chest tube with 140 mL serosanguineous output/shift    GI: Advanced to cardiac diet, 1500 mL fluid restriction. Denies nausea. No BM this shift, bowel regimen in place.    :  Lasix started today, appropriate response. 1600 mL/shift    Gtts: Cardene and Insulin off.     Skin: No skin breakdown noted to back of elbows, sacrum, and heels. Pt shifted self frequently throughout shift. Chair cushion in use.

## 2020-07-25 NOTE — ASSESSMENT & PLAN NOTE
BG goal 140-180    Discontinue intensive IV insulin infusion protocol  Convert to transition IV insulin infusion at 0.5 u/hr - to challenge patient  Moderate dose correction scale  BG monitoring AC/HS/0200    ADDENDUM: Discontinue transition IV insulin infusion, change BG monitoring to AC/HS - Monitor for prandial excursions    Discharge planning: ALEX

## 2020-07-25 NOTE — PROGRESS NOTES
[facility]  Endocrinology  Progress Note    Admit Date: 2020     Reason for Consult: Management of Hyperglycemia     Surgical Procedure and Date: Mitral valve valvuloplasty 20    Lab Results   Component Value Date    HGBA1C 5.2 2020       HPI:   Patient is a 55 y.o. male with a diagnosis of HTN and mitral regurgitation, who is s/p mitral valve valvuloplasty on 20.  No personal history of DM.  Endocrinology consulted for BG management.            Interval HPI:   Overnight events: Remains in ICU.  BG well controlled overnight on intensive insulin protocol, rates ranging from 0.6-1.2 u/hr.  Diet advanced.  Eatin% - clears  Nausea: No  Hypoglycemia and intervention: No  Fever: No  TPN and/or TF: No    BP (!) 105/59 (BP Location: Right arm, Patient Position: Lying)   Pulse 72   Temp 99.9 °F (37.7 °C) (Oral)   Resp 15   Wt 98.1 kg (216 lb 4.3 oz)   SpO2 95%   BMI 34.91 kg/m²     Labs Reviewed and Include    Recent Labs   Lab 20  0314   *   CALCIUM 7.8*      K 4.0   CO2 24   *   BUN 16   CREATININE 1.1     Lab Results   Component Value Date    WBC 14.02 (H) 2020    HGB 10.2 (L) 2020    HCT 30.4 (L) 2020    MCV 93 2020     (L) 2020     No results for input(s): TSH, FREET4 in the last 168 hours.  Lab Results   Component Value Date    HGBA1C 5.2 2020       Nutritional status:   Body mass index is 34.91 kg/m².  Lab Results   Component Value Date    ALBUMIN 4.0 2020     No results found for: PREALBUMIN    Estimated Creatinine Clearance: 84.2 mL/min (based on SCr of 1.1 mg/dL).    Accu-Checks  Recent Labs     20  2200 20  2312 20  0010 20  0103 20  0205 20  0313 20  0417 20  0518 20  0613 20  0718   POCTGLUCOSE 196* 153* 166* 136* 136* 152* 150* 135* 138* 146*       Current Medications and/or Treatments Impacting Glycemic  Control  Immunotherapy:    Immunosuppressants     None        Steroids:   Hormones (From admission, onward)    None        Pressors:    Autonomic Drugs (From admission, onward)    Start     Stop Route Frequency Ordered    07/24/20 1630  EPINEPHrine (ADRENALIN) 5 mg in sodium chloride 0.9% 250 mL infusion     Question Answer Comment   Titrate by: (in mcg/kg/min) 0.02    Titrate interval: (in minutes) 5    Titrate to maintain: (SBP or MAP or Cardiac Index) MAP    Greater than: (in mmHg) 65    Cardiac index greater than: (in L/min) 2.2    Maximum dose: (in mcg/kg/min) 2        -- IV Continuous 07/24/20 1623        Hyperglycemia/Diabetes Medications:   Antihyperglycemics (From admission, onward)    Start     Stop Route Frequency Ordered    07/24/20 1630  insulin regular 100 Units in sodium chloride 0.9% 100 mL infusion     Question:  Insulin rate changes (DO NOT MODIFY ANSWER)  Answer:  \\[email or web address]\epic\Images\Pharmacy\InsulinInfusions\CTS INSULIN [id number].pdf    -- IV Continuous 07/24/20 1623          ASSESSMENT and PLAN    * S/P mitral valve repair  S/p MVr on 7/24/20  Managed per primary team  Avoid hypoglycemia  Optimize BG control for surgical wound healing        Acute hyperglycemia  BG goal 140-180    Discontinue intensive IV insulin infusion protocol  Convert to transition IV insulin infusion at 0.5 u/hr - to challenge patient  Moderate dose correction scale  BG monitoring AC/HS/0200    ADDENDUM: Discontinue transition IV insulin infusion, change BG monitoring to AC/HS - Monitor for prandial excursions    Discharge planning: TBD      Obesity (BMI 30-39.9)  may contribute to insulin resistance          Brijesh Ding NP  Endocrinology  [facility]

## 2020-07-25 NOTE — PLAN OF CARE
Problem: Occupational Therapy Goal  Goal: Occupational Therapy Goal  Description: Goals to be met by: 8/5/20    Patient will increase functional independence with ADLs by performing:    UE Dressing with [place].  LE Dressing with Bradley.  Grooming while standing at sink with Modified [place].  Toileting from toilet with Modified [place] for hygiene and clothing management.   Step transfer with Modified [place] with no AD to prepare for household mobility to complete occupations of choice  Toilet transfer to toilet with Modified [place].  Pt will adhere to 3/3 sternal precautions for 3 consecutive sessions without any verbal cues.     Outcome: Ongoing, Progressing     OT evaluation complete and POC established. See evaluation note for further details.   Disposition recommendation: Home with home health    Renetta Tripp OTR/L  7/25/20

## 2020-07-25 NOTE — OP NOTE
DATE OF PROCEDURE:  07/24/2020     ATTENDING SURGEON:  Cornel Guzman M.D.    ASSISTANT: Elsi Kapoor MD, (Cardiothoracic Resident)     PREOPERATIVE DIAGNOSES:  1.  Severe mitral regurgitation.  2.  Hypertension     POSTOPERATIVE DIAGNOSES:  1.  Severe mitral regurgitation.  2.  Hypertension    OPERATION PERFORMED:  Mitral valve repair with quadrangular resection of the P2   scallop of the posterior leaflet of the mitral valve, sliding plasty, central Jenni stitch, and 33 mm Medtronic Simulus band annuloplasty.     ANESTHESIA:  General endotracheal.     ESTIMATED BLOOD LOSS:  100 mL.     BRIEF HISTORY:  Mr. Merino is a 55 year old gentleman who initially had trouble regulating his blood pressure.   His primary care physician referred him to a cardiologist to did an echo.  This study demonstrated severe mitral regurgitation.   He reported fatigue and decreased stamina.  Given the findings on echo, and his symptoms, he now presents for surgical correction.     PROCEDURE IN DETAIL:  After obtaining informed and written consent, the patient   was brought to the Operating Room and placed on the operating table in supine   position.  After induction of adequate general endotracheal anesthesia, the   patient's chest, abdomen, pelvis and bilateral lower extremities were prepped   and draped in the usual sterile fashion.  An upper midline skin incision was   made, and a median sternotomy was performed.  A pericardial well was created.    The patient was systemically heparinized.  Cannulation sutures were placed in   the aorta and in the superior vena cava.  The aortic cannula was inserted,   followed by the venous.  An IVC cannula was also placed.  Antegrade and   retrograde cardioplegia catheters were placed.  The patient was then put on   cardiopulmonary bypass.  Once on bypass, circumferential control was obtained   over the superior vena cava.  The aortic crossclamp was applied, and the heart   was arrested  using cold blood enhanced antegrade cardioplegia.  A prompt   electromechanical arrest was achieved.  Once the cardioplegia was all in, the   cannulas were repositioned.  A left atriotomy was made near the right-sided   pulmonary veins.  The Joann retractor was placed for exposure.  The mitral   valve was evaluated.  The entire valve demonstrated   myxomatous change, and was quite redundant.  It was a typical Friend valve.  Multiple nonpledgeted 2-0 Ethibond sutures were placed along   the posterior annulus from the medial trigone to the lateral trigone.The valve was then   tested, and a persistent jet at the commissure at A3 and P3 was seen.  A medial commissuroplasty was performed using a running 5-0 Ethibond in 2 layers.The annulus was   sized, and a 33 mm band was selected.  The band was brought up, the annuloplasty   sutures were passed through it, and it was slid into position.  It seated   nicely.  The sutures were tied and then cut.  The valve was again tested, and   again no mitral regurgitation was seen.  The left atriotomy was then closed in a   single layer using running 4-0 Prolene suture.  Prior to closing the left   atrium, de-airing maneuvers were performed.  Once the left atrium was closed,   the hot shot was given retrograde.  Additional de-airing maneuvers were   performed, and the aortic cross-clamp was removed.  The patient was subsequently   weaned from cardiopulmonary bypass.  The patient did separate easily from   bypass.    Once off bypass, the valve was evaluated using COLLETTE.  There was significant systolic anterior motion and left ventricular outflow tract obstruction.  This was clearly unacceptable.  The patient was placed back on cardiopulmonary bypass.  The aortic cross-clamp was applied, and the heart was arrested using cold blood enhance to antegrade cardioplegia.  A prompt electromechanical arrest was achieved.  The left atrial incision was reopened.  The retractor was placed, and  the annuloplasty band was removed.  Multiple non pledgetted 2-0 Ethibond sutures were placed along the posterior annulus from the medial trigone to the lateral trigone.  I upsized the band significantly, and a 37 mm band was brought up.  The sutures were passed through it, and it was slid into position.  It seated nicely.  The sutures were tied and then cut.  The valve was again tested, and no regurgitation was seen.  The left atriotomy was then closed in a single layer using running  4-0 Prolene.  Prior to closing the left atrium, de-airing maneuvers were performed.  The hotshot was given retrograde.  The aortic cross-clamp was removed.  The patient was subsequently weaned from cardiopulmonary bypass.  Once off bypass, the valve was evaluated using COLLETTE.  While much improved, there was still significant systolic anterior motion.  The patient was placed back on cardiopulmonary bypass.  The aortic cross-clamp was applied, and the heart was arrested using cold blood enhance to antegrade cardioplegia.  A prompt electromechanical arrest was achieved.  Once the cardioplegia was all in, the left atrium was again opened.  The retractor was placed.  The 37 mm band was removed.  Given the extreme redundancy of the leaflets, and the fact that the largest band we had available was a 39 mm band, I did not believe that up sizing would result in resolution of the systolic anterior motion.  The medial commissuroplasty suture was removed.  Multiple nonpledgeted 2-0 Ethibond sutures were placed along the posterior  Annulus from the medial trigone to the lateral trigone.  The P2 scallop of the posterior leaflet was disproportionately larger than the other scallops.  Silk sutures were placed around cords at either side of the P2 scallop.  The P2 scallop was then resected.  The P1 and P3 scallops were then partially detached from the annulus.  The sliding plasty was then performed using running 4-0 Prolene suture.   The leaflet was then  reconstructed using a running 5-0 Ethibond.  The annulus was sized, and a 33 mm band was selected.  The band was brought up, the sutures were passed through it, and it was slid into position.  It seated nicely.  The sutures were tied and then cut.  The valve was again tested, and only central MR was seen.  Given the concern about recurrent systolic anterior motion, and the presence of trivial central mitral regurgitation, I felt that placement of an Jenni stitch was indicated.  This was done using a 5 0 Ethibond.  The valve was again tested, and no mitral regurgitation was seen.  The left atrium was then closed in a single layer using running 4-0 Prolene.  Prior to closing the left atrium, de-airing maneuvers were performed.  Once the left atrium was closed, the hotshot was given retrograde.  Additional de-airing maneuvers were performed, and the aortic cross-clamp was removed.  The patient was subsequently weaned from cardiopulmonary bypass.  The patient did separate easily from bypass. Once off bypass, all surgical sites were inspected.  There was good   hemostasis.  The valve was evaluated using COLLETTE.  There was no residual mitral   regurgitation seen.   The coaptation point had successfully been moved posteriorly, and there was no longer any systolic anterior motion. The mean gradient through the valve was 2 mmHg.  The test   dose of protamine was administered, and this was well tolerated.  The total dose   was then given.  long-term through the total dose, all cannulas were removed.    All surgical sites were again inspected, again there was good hemostasis.    Ventricular pacing wires were placed.  Drains were placed.  After again   confirming adequate hemostasis, the patient's chest was closed using eight #6   stainless steel wires to reapproximate the sternum.  The overlying soft tissues   were reapproximated using absorbable suture material.  The patient's chest was   washed and dried, and a dry dressing was  applied.  The patient tolerated the   procedure well, there were no complications.  At the conclusion of the case,   sponge and instrument counts were correct.

## 2020-07-25 NOTE — PROGRESS NOTES
[facility]  General Surgery  Progress Note    Subjective:       Post-Op Info:  Procedure(s) (LRB):  VALVULOPLASTY, MITRAL VALVE (N/A)   1 Day Post-Op     Interval History: NAEO, VSS. POD#1 MVR. 25 Protamine given yesterday for elevated PTT. Cardene gtt for MAP 60-80. UOP /hr. CT 20-40cc/hr, total 350cc overnight. Epi gtt 0.02, insulin gtt at 1. Adequate saturations on 3L NC.     Medications:  Continuous Infusions:   dextrose 5 % and 0.45 % NaCl with KCl 20 mEq 5 mL/hr at 07/25/20 0600    epinephrine      insulin (HUMAN R) infusion (adults) 1 Units/hr (07/25/20 0600)    nicardipine 5 mg/hr (07/25/20 0600)    propofoL       Scheduled Meds:   albuterol-ipratropium  3 mL Nebulization Q4H    aspirin  325 mg Per NG tube Once    aspirin  325 mg Oral Daily    ceFAZolin (ANCEF) IVPB  2 g Intravenous Q8H    docusate sodium  100 mg Oral BID    mupirocin  1 g Nasal BID    pantoprazole  40 mg Intravenous Daily    polyethylene glycol  17 g Oral Daily     PRN Meds:acetaminophen, albuterol-ipratropium, aspirin, bisacodyL, dextrose 50%, dextrose 50%, magnesium sulfate IVPB, magnesium sulfate IVPB, metoclopramide HCl, ondansetron, oxyCODONE, oxyCODONE, potassium chloride in water **AND** potassium chloride in water **AND** potassium chloride in water, promethazine (PHENERGAN) IVPB, sodium chloride 0.9%, sodium phosphate IVPB, sodium phosphate IVPB, sodium phosphate IVPB     Review of patient's allergies indicates:  No Known Allergies  Objective:     Vital Signs (Most Recent):  Temp: 99.2 °F (37.3 °C) (07/25/20 0300)  Pulse: 74 (07/25/20 0630)  Resp: 15 (07/25/20 0630)  BP: 105/60 (07/25/20 0600)  SpO2: 95 % (07/25/20 0630) Vital Signs (24h Range):  Temp:  [97.8 °F (36.6 °C)-99.6 °F (37.6 °C)] 99.2 °F (37.3 °C)  Pulse:  [70-90] 74  Resp:  [7-30] 15  SpO2:  [90 %-100 %] 95 %  BP: ()/(56-67) 105/60  Arterial Line BP: (100-126)/(46-71) 112/49     Weight: 98.1 kg (216 lb 4.3 oz)  Body mass  index is 34.91 kg/m².    Intake/Output - Last 3 Shifts       07/23 0700 - 07/24 0659 07/24 0700 - 07/25 0659    I.V. (mL/kg)  3169 (32.3)    Blood  200    Total Intake(mL/kg)  3369 (34.3)    Urine (mL/kg/hr)  2665 (1.1)    Chest Tube  450    Total Output  3115    Net  +254                Physical Exam  Vitals signs and nursing note reviewed.   Constitutional:       General: He is not in acute distress.  Cardiovascular:      Rate and Rhythm: Normal rate.      Pulses: Normal pulses.      Comments: Cardene gtt for MAP 60-80  Pulmonary:      Effort: Pulmonary effort is normal. No respiratory distress.      Comments: 3L NC  Abdominal:      General: There is no distension.      Palpations: Abdomen is soft.      Tenderness: There is no abdominal tenderness.   Skin:     General: Skin is warm and dry.   Neurological:      General: No focal deficit present.      Mental Status: He is alert and oriented to person, place, and time.         Significant Labs:  CBC:   Recent Labs   Lab 07/25/20  0314   WBC 14.02*   RBC 3.26*   HGB 10.2*   HCT 30.4*   *   MCV 93   MCH 31.3*   MCHC 33.6     BMP:   Recent Labs   Lab 07/25/20  0314   *      K 4.0   *   CO2 24   BUN 16   CREATININE 1.1   CALCIUM 7.8*   MG 2.2     CMP:   Recent Labs   Lab 07/23/20  1231  07/25/20  0314   *   < > 149*   CALCIUM 9.3   < > 7.8*   ALBUMIN 4.0  --   --    PROT 7.0  --   --       < > 142   K 4.3   < > 4.0   CO2 27   < > 24      < > 111*   BUN 19   < > 16   CREATININE 1.1   < > 1.1   ALKPHOS 84  --   --    ALT 35  --   --    AST 22  --   --    BILITOT 0.7  --   --     < > = values in this interval not displayed.       Significant Diagnostics:  I have reviewed all pertinent imaging results/findings within the past 24 hours.    Assessment/Plan:     * S/P mitral valve repair  S/p MVR 7/24/20.     Neuro:   - Oxy 5/10 PRN for pain, dilaudid PRN for IV breakthrough   - Following commands     Pulmonary:   - Extubated  7/24/20  - 3L NC  - PRN breathing treatments     Cardiac:  - MAP goal 60-80  - Epi 0.02  - CT 20-40cc/hr output, 350/shift, 430 total, SS  - FB -497/ 24 hrs   - Daily CXR  - CVP 10-18  - Cardene gtt       Renal:   - UOP 75 - 160 cc/hr, total 2515 / 24 hrs   - FB -497 cc / 24 hrs   - BUN 16 / Cr 1.1       Fluids/Electrolytes/Nutrition/GI:   -Nutritional status:enteral feeding  - Nausea resolved with phenergan  - CLD  - Will advance diet as tolerated  -replace lytes PRN  -Bowel regimen    Hematology/Oncology:  -H/H stable, 10.2 (10.3) / 30.4  -INR/Plts 1.1, PT 11.8; PTT 28.4  -Anticoagulation: ASA   - Protamine 25 x1 given yesterday for PTT 33.1    Infectious Disease:   -Afebrile  -WBC 14.02 (19.81)  -Abx: ancef    Endocrine:  -Glucose goal <180  - Accuchecks   - Insulin gtt     Dispo:  - S/D to CSU     Primary: TANK Jay MD  General Surgery  [facility]

## 2020-07-25 NOTE — PLAN OF CARE
Plan of Care Note  Cardiothoracic Surgery    Kamari Merino is a 55 y.o. male with MR s/p mitral valve repair (7/24).    Specific issues:   --weaned off cardene this AM  --lasix 40 bid starting in PM  --metop 12.5 bid  --to stepdown  --keep CVC and coy today  --encourage ambulation and IS  --continue ASA    Plan of care for patient was discussed with ICU staff including nurses, residents, and faculty and appropriate consulting services.    Will continue to monitor patient's hemodynamics, functionality, neuro status, fluid status and renal function, and labs and will adjust medications and fluids as necessary while monitoring appropriateness for de-escalation of support and monitoring and transport to stepdown unit.    Shorty Perez MD  Cardiothoracic Surgery Fellow

## 2020-07-25 NOTE — SUBJECTIVE & OBJECTIVE
Interval History: NAEO, VSS. POD#1 MVR. 25 Protamine given yesterday for elevated PTT. Cardene gtt for MAP 60-80. UOP /hr. CT 20-40cc/hr, total 350cc overnight. Epi gtt 0.02, insulin gtt at 1. Adequate saturations on 3L NC.     Medications:  Continuous Infusions:   dextrose 5 % and 0.45 % NaCl with KCl 20 mEq 5 mL/hr at 07/25/20 0600    epinephrine      insulin (HUMAN R) infusion (adults) 1 Units/hr (07/25/20 0600)    nicardipine 5 mg/hr (07/25/20 0600)    propofoL       Scheduled Meds:   albuterol-ipratropium  3 mL Nebulization Q4H    aspirin  325 mg Per NG tube Once    aspirin  325 mg Oral Daily    ceFAZolin (ANCEF) IVPB  2 g Intravenous Q8H    docusate sodium  100 mg Oral BID    mupirocin  1 g Nasal BID    pantoprazole  40 mg Intravenous Daily    polyethylene glycol  17 g Oral Daily     PRN Meds:acetaminophen, albuterol-ipratropium, aspirin, bisacodyL, dextrose 50%, dextrose 50%, magnesium sulfate IVPB, magnesium sulfate IVPB, metoclopramide HCl, ondansetron, oxyCODONE, oxyCODONE, potassium chloride in water **AND** potassium chloride in water **AND** potassium chloride in water, promethazine (PHENERGAN) IVPB, sodium chloride 0.9%, sodium phosphate IVPB, sodium phosphate IVPB, sodium phosphate IVPB     Review of patient's allergies indicates:  No Known Allergies  Objective:     Vital Signs (Most Recent):  Temp: 99.2 °F (37.3 °C) (07/25/20 0300)  Pulse: 74 (07/25/20 0630)  Resp: 15 (07/25/20 0630)  BP: 105/60 (07/25/20 0600)  SpO2: 95 % (07/25/20 0630) Vital Signs (24h Range):  Temp:  [97.8 °F (36.6 °C)-99.6 °F (37.6 °C)] 99.2 °F (37.3 °C)  Pulse:  [70-90] 74  Resp:  [7-30] 15  SpO2:  [90 %-100 %] 95 %  BP: ()/(56-67) 105/60  Arterial Line BP: (100-126)/(46-71) 112/49     Weight: 98.1 kg (216 lb 4.3 oz)  Body mass index is 34.91 kg/m².    Intake/Output - Last 3 Shifts       07/23 0700 - 07/24 0659 07/24 0700 - 07/25 0659    I.V. (mL/kg)  3169 (32.3)    Blood  200    Total Intake(mL/kg)   3369 (34.3)    Urine (mL/kg/hr)  2665 (1.1)    Chest Tube  450    Total Output  3115    Net  +254                Physical Exam  Vitals signs and nursing note reviewed.   Constitutional:       General: He is not in acute distress.  Cardiovascular:      Rate and Rhythm: Normal rate.      Pulses: Normal pulses.      Comments: Cardene gtt for MAP 60-80  Pulmonary:      Effort: Pulmonary effort is normal. No respiratory distress.      Comments: 3L NC  Abdominal:      General: There is no distension.      Palpations: Abdomen is soft.      Tenderness: There is no abdominal tenderness.   Skin:     General: Skin is warm and dry.   Neurological:      General: No focal deficit present.      Mental Status: He is alert and oriented to person, place, and time.         Significant Labs:  CBC:   Recent Labs   Lab 07/25/20  0314   WBC 14.02*   RBC 3.26*   HGB 10.2*   HCT 30.4*   *   MCV 93   MCH 31.3*   MCHC 33.6     BMP:   Recent Labs   Lab 07/25/20  0314   *      K 4.0   *   CO2 24   BUN 16   CREATININE 1.1   CALCIUM 7.8*   MG 2.2     CMP:   Recent Labs   Lab 07/23/20  1231  07/25/20  0314   *   < > 149*   CALCIUM 9.3   < > 7.8*   ALBUMIN 4.0  --   --    PROT 7.0  --   --       < > 142   K 4.3   < > 4.0   CO2 27   < > 24      < > 111*   BUN 19   < > 16   CREATININE 1.1   < > 1.1   ALKPHOS 84  --   --    ALT 35  --   --    AST 22  --   --    BILITOT 0.7  --   --     < > = values in this interval not displayed.       Significant Diagnostics:  I have reviewed all pertinent imaging results/findings within the past 24 hours.

## 2020-07-25 NOTE — PLAN OF CARE
MAP 60-70s throughout shift. NSR 70-80s.    IV morphine given x1 and oxy 5mg given for complaint of pain x1 with full relief from pain.  Nausea reported and IV phenergan given as ordered with full relief.  Diet advanced to clear liquids and tolerating water/ ice chips.     Pt on 3L NC.  IS performed while awake.   Chest tube output 350mL.  Urine output 75-200mL/hour.     No skin breakdown noted and weight shift assistance provided.  Dressing to chest clean, dry and intact.  Plan of care updated with patient and wife this shift.   Will continue to monitor.

## 2020-07-25 NOTE — BRIEF OP NOTE
[facility]  Cardiothoracic Surgery  Operative Note    SUMMARY     Date of Procedure: 7/24/2020     Procedure: Procedure(s) (LRB):  REPAIR, MITRAL VALVE with quadrangular resection of P2, sliding plasty, central Jenni stitch, and 33 mm Medtronic Simulus band annuloplasty  [id number]    Surgeon(s) and Role:     * Cornel Guzman MD - Primary     * Elsi Kapoor MD - Fellow    Assisting Surgeon: None    Pre-Operative Diagnosis: Mitral valve insufficiency, unspecified etiology [I34.0]    Post-Operative Diagnosis: Post-Op Diagnosis Codes:     * Mitral valve insufficiency, unspecified etiology [I34.0]    Anesthesia: General        Description of the Findings of the Procedure: Typical Friend valve with redundant and myxomatous leaflets. No MR on post COLLETTE.        Complications: None    Estimated Blood Loss (EBL): 100 mL           Implants:   Implant Name Type Inv. Item Serial No.  Lot No. LRB No. Used Action   BAND ANNULPLSTY [id number] - [id number] Valve BAND ANNULPLSTY [id number] [employer] [id number] N/A 1 Implanted and Explanted   BAND ANNULOPLASTY MITRAL [id number] - [id number] Valve BAND ANNULOPLASTY MITRAL 37MM [id number] [employer] [id number] N/A 1 Implanted and Explanted   BAND ANNULPLSTY [id number] - [id number] Valve BAND ANNULPLSTY [id number] [employer] [place] [id number] N/A 1 Implanted       Specimens:   Specimen (12h ago, onward)    None                  Attestation:  I was present and scrubbed for the procedure.

## 2020-07-25 NOTE — PT/OT/SLP EVAL
Occupational Therapy   Evaluation    Name: Kamari Merino  MRN: [id number]  Admitting Diagnosis:  S/P mitral valve repair 1 Day Post-Op    Recommendations:     Discharge Recommendations: home health OT  Discharge Equipment Recommendations:  none  Barriers to discharge:  None    Assessment:     Kamari Merino is a 55 y.o. male with a medical diagnosis of S/P mitral valve repair.  He presents with the following performance deficits affecting function: weakness, impaired self care skills, impaired cardiopulmonary response to activity, impaired endurance, impaired functional mobilty. Pt tolerated assessment well this date as noted in pt's ability to complete bed mobility with CGA-min A and transfers with CGA while adhering to sternal precautions. Pt presented with WFL strength and ROM to complete occupations of choice. Pt limited in ADL completion and mobility progression 2/2 increased lines. Pt with good rehab prognosis with anticipation to return home with home health to maximize pt's functional I within the home environment. Pt will benefit from skilled OT in house to ensure successful completion of ADLs while maintaining safety, independence, and sternal precautions.     Rehab Prognosis: Good; patient would benefit from acute skilled OT services to address these deficits and reach maximum level of function.       Plan:     Patient to be seen 5 x/week to address the above listed problems via self-care/home management, therapeutic activities, therapeutic exercises  · Plan of Care Expires: 08/23/20  · Plan of Care Reviewed with: patient, spouse    Subjective     Chief Complaint: None  Patient/Family Comments/goals: Pt agreeable to OT/PT evaluation and OT POC.    Occupational Profile:  Living Environment: Pt lives with spouse in a 2SH with 2 VERNELL.   Previous level of function: I in all ADls/IADLs including working and driving; no hx of falls  Roles and Routines: Homemaker,  for [employer], spouse  Equipment Used at  Home:  none  Assistance upon Discharge: Pt will have assistance from spouse upon discharge.     Pain/Comfort:  · Pain Rating 1: 0/10(at rest; pain only when coughing and deep breathing)  · Location - Orientation 1: generalized  · Location 1: (sternum)  · Pain Rating Post-Intervention 1: (no increase in pain with mobility)    Patients cultural, spiritual, Rastafarian conflicts given the current situation: no    Objective:     Communicated with: RN prior to session.  Patient found HOB elevated with pulse ox (continuous), telemetry, peripheral IV, oxygen, coy catheter, central line, chest tube upon OT entry to room.    General Precautions: Standard, fall, sternal   Orthopedic Precautions:N/A   Braces: N/A     Occupational Performance:    Bed Mobility:  Pillow utilized to adhere to sternal precautions   · Patient completed Rolling/Turning to Right with contact guard assistance  · Patient completed Scooting/Bridging with contact guard assistance for anterior hip scooting   · Patient completed Supine to Sit with minimum assistance for trunk elevation to reach upright sitting     Functional Mobility/Transfers:  · Patient completed Sit <> Stand Transfer from bed with contact guard assistance  with  no assistive device   · Patient completed Bed <> Chair Transfer using Step Transfer technique with contact guard assistance with no assistive device  · x2 persons for line management safety  · Functional Mobility: Pt ambulated a few steps toward bedside chair with CGA and no AD while holding onto pillow to protect sternum.   · No LOB noted  · No dizziness reported     Activities of Daily Living:  · Toileting: dependence with use of coy for voiding    Cognitive/Visual Perceptual:  Cognitive/Psychosocial Skills:     -       Oriented to: Person, Place, Time and Situation   -       Follows Commands/attention:Follows multistep  commands  -       Communication: clear/fluent  -       Memory: No Deficits noted  -       Safety  awareness/insight to disability: intact   -       Mood/Affect/Coping skills/emotional control: Appropriate to situation, Cooperative and Pleasant  Visual/Perceptual:      -Intact no defs noted    Physical Exam:  Balance:    -       EOB: I Standing: SBA-CGA  Postural examination/scapula alignment:    -       No postural abnormalities identified  Skin integrity: Visible skin intact  Edema:  None noted  Sensation:    -       Intact  Upper Extremity Range of Motion:     -       Right Upper Extremity: WFL  -       Left Upper Extremity: WFL  Upper Extremity Strength:    -       Right Upper Extremity: WFL  -       Left Upper Extremity: WFL   Strength:    -       Right Upper Extremity: WFL  -       Left Upper Extremity: WFL  Fine Motor Coordination:    -       Intact    AMPAC 6 Click ADL:  AMPAC Total Score: 17    Treatment & Education:  - Role of OT/ OT POC  - Self care safety/ independence  - Functional transfer/ mobility safety  - Bed mobility safety  - Pursed lip breathing   - Importance of sitting UIC throughout the day as tolerated for lung expansion to encourage deep breathing  - Sternal precautions: No pushing, pulling, lifting more than 5 pounds  - Importance of ambulating with nsg to prevent debility during stay; limited distance 2/2 lines       Patient left up in chair with all lines intact, call button in reach and spouse present    GOALS:   Multidisciplinary Problems     Occupational Therapy Goals        Problem: Occupational Therapy Goal    Goal Priority Disciplines Outcome Interventions   Occupational Therapy Goal     OT, PT/OT Ongoing, Progressing    Description: Goals to be met by: 8/5/20    Patient will increase functional independence with ADLs by performing:    UE Dressing with [place].  LE Dressing with [place].  Grooming while standing at sink with Modified [place].  Toileting from toilet with Modified [place] for hygiene and clothing management.   Step transfer with Modified  [place] with no AD to prepare for household mobility to complete occupations of choice  Toilet transfer to toilet with Modified [place].  Pt will adhere to 3/3 sternal precautions for 3 consecutive sessions without any verbal cues.                      History:     Past Medical History:   Diagnosis Date    Hypertension        History reviewed. No pertinent surgical history.    Time Tracking:     OT Date of Treatment: 07/25/20  OT Start Time: 1110  OT Stop Time: 1124  OT Total Time (min): 14 min    Billable Minutes:Evaluation 14    Renetta Tripp OT  7/25/2020

## 2020-07-25 NOTE — ASSESSMENT & PLAN NOTE
S/p MVR 7/24/20.     Neuro:   - Oxy 5/10 PRN for pain, dilaudid PRN for IV breakthrough   - Following commands     Pulmonary:   - Extubated 7/24/20  - 3L NC  - PRN breathing treatments     Cardiac:  - MAP goal 60-80  - Epi 0.02  - CT 20-40cc/hr output, 350/shift, 430 total, SS  - FB -497/ 24 hrs   - Daily CXR  - CVP 10-18  - Cardene gtt       Renal:   - UOP 75 - 160 cc/hr, total 2515 / 24 hrs   - FB -497 cc / 24 hrs   - BUN 16 / Cr 1.1       Fluids/Electrolytes/Nutrition/GI:   -Nutritional status:enteral feeding  - Nausea resolved with phenergan  - CLD  - Will advance diet as tolerated  -replace lytes PRN  -Bowel regimen    Hematology/Oncology:  -H/H stable, 10.2 (10.3) / 30.4  -INR/Plts 1.1, PT 11.8; PTT 28.4  -Anticoagulation: ASA   - Protamine 25 x1 given yesterday for PTT 33.1    Infectious Disease:   -Afebrile  -WBC 14.02 (19.81)  -Abx: ancef    Endocrine:  -Glucose goal <180  - Accuchecks   - Insulin gtt     Dispo:  - S/D to CSU     Primary: CTS

## 2020-07-26 LAB
ANION GAP SERPL CALC-SCNC: 7 MMOL/L (ref 8–16)
BASOPHILS # BLD AUTO: 0.05 K/UL (ref 0–0.2)
BASOPHILS NFR BLD: 0.3 % (ref 0–1.9)
BUN SERPL-MCNC: 14 MG/DL (ref 6–20)
CALCIUM SERPL-MCNC: 7.9 MG/DL (ref 8.7–10.5)
CHLORIDE SERPL-SCNC: 106 MMOL/L (ref 95–110)
CO2 SERPL-SCNC: 26 MMOL/L (ref 23–29)
CREAT SERPL-MCNC: 0.9 MG/DL (ref 0.5–1.4)
DIFFERENTIAL METHOD: ABNORMAL
EOSINOPHIL # BLD AUTO: 0 K/UL (ref 0–0.5)
EOSINOPHIL NFR BLD: 0.1 % (ref 0–8)
ERYTHROCYTE [DISTWIDTH] IN BLOOD BY AUTOMATED COUNT: 13.4 % (ref 11.5–14.5)
EST. GFR  (AFRICAN AMERICAN): >60 ML/MIN/1.73 M^2
EST. GFR  (NON AFRICAN AMERICAN): >60 ML/MIN/1.73 M^2
GLUCOSE SERPL-MCNC: 113 MG/DL (ref 70–110)
HCT VFR BLD AUTO: 28.5 % (ref 40–54)
HGB BLD-MCNC: 9.7 G/DL (ref 14–18)
IMM GRANULOCYTES # BLD AUTO: 0.09 K/UL (ref 0–0.04)
IMM GRANULOCYTES NFR BLD AUTO: 0.6 % (ref 0–0.5)
LYMPHOCYTES # BLD AUTO: 1.4 K/UL (ref 1–4.8)
LYMPHOCYTES NFR BLD: 9 % (ref 18–48)
MAGNESIUM SERPL-MCNC: 1.8 MG/DL (ref 1.6–2.6)
MCH RBC QN AUTO: 31.7 PG (ref 27–31)
MCHC RBC AUTO-ENTMCNC: 34 G/DL (ref 32–36)
MCV RBC AUTO: 93 FL (ref 82–98)
MONOCYTES # BLD AUTO: 1 K/UL (ref 0.3–1)
MONOCYTES NFR BLD: 6.6 % (ref 4–15)
NEUTROPHILS # BLD AUTO: 12.8 K/UL (ref 1.8–7.7)
NEUTROPHILS NFR BLD: 83.4 % (ref 38–73)
NRBC BLD-RTO: 0 /100 WBC
PHOSPHATE SERPL-MCNC: 2 MG/DL (ref 2.7–4.5)
PLATELET # BLD AUTO: 105 K/UL (ref 150–350)
PMV BLD AUTO: 12 FL (ref 9.2–12.9)
POCT GLUCOSE: 117 MG/DL (ref 70–110)
POCT GLUCOSE: 134 MG/DL (ref 70–110)
POTASSIUM SERPL-SCNC: 3.9 MMOL/L (ref 3.5–5.1)
POTASSIUM SERPL-SCNC: 3.9 MMOL/L (ref 3.5–5.1)
RBC # BLD AUTO: 3.06 M/UL (ref 4.6–6.2)
SODIUM SERPL-SCNC: 139 MMOL/L (ref 136–145)
WBC # BLD AUTO: 15.36 K/UL (ref 3.9–12.7)

## 2020-07-26 PROCEDURE — 83735 ASSAY OF MAGNESIUM: CPT

## 2020-07-26 PROCEDURE — C9113 INJ PANTOPRAZOLE SODIUM, VIA: HCPCS | Performed by: NURSE PRACTITIONER

## 2020-07-26 PROCEDURE — 99900035 HC TECH TIME PER 15 MIN (STAT)

## 2020-07-26 PROCEDURE — 80048 BASIC METABOLIC PNL TOTAL CA: CPT

## 2020-07-26 PROCEDURE — 97116 GAIT TRAINING THERAPY: CPT

## 2020-07-26 PROCEDURE — 63600175 PHARM REV CODE 636 W HCPCS: Performed by: NURSE PRACTITIONER

## 2020-07-26 PROCEDURE — 99233 PR SUBSEQUENT HOSPITAL CARE,LEVL III: ICD-10-PCS | Mod: ,,, | Performed by: SURGERY

## 2020-07-26 PROCEDURE — 63600175 PHARM REV CODE 636 W HCPCS: Performed by: STUDENT IN AN ORGANIZED HEALTH CARE EDUCATION/TRAINING PROGRAM

## 2020-07-26 PROCEDURE — 85025 COMPLETE CBC W/AUTO DIFF WBC: CPT

## 2020-07-26 PROCEDURE — 97161 PT EVAL LOW COMPLEX 20 MIN: CPT

## 2020-07-26 PROCEDURE — 25000003 PHARM REV CODE 250: Performed by: NURSE PRACTITIONER

## 2020-07-26 PROCEDURE — 84132 ASSAY OF SERUM POTASSIUM: CPT

## 2020-07-26 PROCEDURE — 99233 SBSQ HOSP IP/OBS HIGH 50: CPT | Mod: ,,, | Performed by: SURGERY

## 2020-07-26 PROCEDURE — 94761 N-INVAS EAR/PLS OXIMETRY MLT: CPT

## 2020-07-26 PROCEDURE — 25000003 PHARM REV CODE 250: Performed by: STUDENT IN AN ORGANIZED HEALTH CARE EDUCATION/TRAINING PROGRAM

## 2020-07-26 PROCEDURE — 84100 ASSAY OF PHOSPHORUS: CPT

## 2020-07-26 PROCEDURE — 20000000 HC ICU ROOM

## 2020-07-26 RX ORDER — METOPROLOL TARTRATE 25 MG/1
12.5 TABLET ORAL 2 TIMES DAILY
Status: DISCONTINUED | OUTPATIENT
Start: 2020-07-26 | End: 2020-07-26

## 2020-07-26 RX ORDER — LANOLIN ALCOHOL/MO/W.PET/CERES
400 CREAM (GRAM) TOPICAL DAILY
Status: DISCONTINUED | OUTPATIENT
Start: 2020-07-26 | End: 2020-07-28 | Stop reason: HOSPADM

## 2020-07-26 RX ORDER — METOPROLOL TARTRATE 25 MG/1
25 TABLET, FILM COATED ORAL 2 TIMES DAILY
Status: DISCONTINUED | OUTPATIENT
Start: 2020-07-26 | End: 2020-07-28 | Stop reason: HOSPADM

## 2020-07-26 RX ORDER — POTASSIUM CHLORIDE 20 MEQ/1
20 TABLET, EXTENDED RELEASE ORAL 2 TIMES DAILY
Status: DISCONTINUED | OUTPATIENT
Start: 2020-07-26 | End: 2020-07-28 | Stop reason: HOSPADM

## 2020-07-26 RX ADMIN — FUROSEMIDE 40 MG: 10 INJECTION, SOLUTION INTRAVENOUS at 09:07

## 2020-07-26 RX ADMIN — SODIUM PHOSPHATE, MONOBASIC, MONOHYDRATE 20.01 MMOL: 276; 142 INJECTION, SOLUTION INTRAVENOUS at 05:07

## 2020-07-26 RX ADMIN — MAGNESIUM OXIDE 400 MG (241.3 MG MAGNESIUM) TABLET 400 MG: TABLET at 09:07

## 2020-07-26 RX ADMIN — METOPROLOL TARTRATE 12.5 MG: 25 TABLET, FILM COATED ORAL at 09:07

## 2020-07-26 RX ADMIN — MUPIROCIN 1 G: 20 OINTMENT TOPICAL at 09:07

## 2020-07-26 RX ADMIN — METOPROLOL TARTRATE 25 MG: 25 TABLET, FILM COATED ORAL at 09:07

## 2020-07-26 RX ADMIN — DOCUSATE SODIUM 100 MG: 100 CAPSULE, LIQUID FILLED ORAL at 09:07

## 2020-07-26 RX ADMIN — POTASSIUM CHLORIDE 20 MEQ: 1500 TABLET, EXTENDED RELEASE ORAL at 09:07

## 2020-07-26 RX ADMIN — OXYCODONE HYDROCHLORIDE 10 MG: 10 TABLET ORAL at 03:07

## 2020-07-26 RX ADMIN — ASPIRIN 325 MG ORAL TABLET 325 MG: 325 PILL ORAL at 09:07

## 2020-07-26 RX ADMIN — OXYCODONE 5 MG: 5 TABLET ORAL at 09:07

## 2020-07-26 RX ADMIN — POTASSIUM CHLORIDE 20 MEQ: 200 INJECTION, SOLUTION INTRAVENOUS at 04:07

## 2020-07-26 RX ADMIN — OXYCODONE HYDROCHLORIDE 10 MG: 10 TABLET ORAL at 05:07

## 2020-07-26 RX ADMIN — POLYETHYLENE GLYCOL 3350 17 G: 17 POWDER, FOR SOLUTION ORAL at 09:07

## 2020-07-26 RX ADMIN — CEFAZOLIN 2 G: 1 INJECTION, POWDER, FOR SOLUTION INTRAMUSCULAR; INTRAVENOUS at 12:07

## 2020-07-26 RX ADMIN — PANTOPRAZOLE SODIUM 40 MG: 40 INJECTION, POWDER, LYOPHILIZED, FOR SOLUTION INTRAVENOUS at 09:07

## 2020-07-26 RX ADMIN — MAGNESIUM SULFATE IN WATER 2 G: 40 INJECTION, SOLUTION INTRAVENOUS at 05:07

## 2020-07-26 RX ADMIN — OXYCODONE HYDROCHLORIDE 10 MG: 10 TABLET ORAL at 12:07

## 2020-07-26 RX ADMIN — OXYCODONE HYDROCHLORIDE 10 MG: 10 TABLET ORAL at 09:07

## 2020-07-26 NOTE — PROGRESS NOTES
[facility]  Critical Care - Surgery  Progress Note    Patient Name: Kamari Merino  MRN: [id number]  Admission Date: 7/24/2020  Hospital Length of Stay: 2 days  Code Status: Full Code  Attending Provider: Cornel Guzman MD  Primary Care Provider: Primary Doctor No   Principal Problem: S/P mitral valve repair    Subjective:     Interval History/Significant Events: NAEO, VSS. -250cc/hr, CT 70 cc output overnight. Pain is well controlled, up to cardiac chair yesterday.     Follow-up For: Procedure(s) (LRB):  VALVULOPLASTY, MITRAL VALVE (N/A)    Post-Operative Day: 2 Days Post-Op    Objective:     Vital Signs (Most Recent):  Temp: 99.4 °F (37.4 °C) (07/26/20 0521)  Pulse: 84 (07/26/20 0600)  Resp: 15 (07/26/20 0600)  BP: 93/68 (07/26/20 0600)  SpO2: 97 % (07/26/20 0600) Vital Signs (24h Range):  Temp:  [99.4 °F (37.4 °C)-100.2 °F (37.9 °C)] 99.4 °F (37.4 °C)  Pulse:  [] 84  Resp:  [7-26] 15  SpO2:  [91 %-100 %] 97 %  BP: ()/(59-70) 93/68  Arterial Line BP: (106-127)/(43-52) 111/52     Weight: 95.4 kg (210 lb 5.1 oz)  Body mass index is 33.95 kg/m².      Intake/Output Summary (Last 24 hours) at 7/26/2020 0629  Last data filed at 7/26/2020 0600  Gross per 24 hour   Intake 1493.95 ml   Output 3197 ml   Net -1703.05 ml       Physical Exam  Vitals signs and nursing note reviewed.   Constitutional:       General: He is not in acute distress.  Cardiovascular:      Rate and Rhythm: Normal rate.      Pulses: Normal pulses.      Comments: Sternotomy c/d/i with overlying dressing. CT with 270 cc output  Pulmonary:      Effort: Pulmonary effort is normal. No respiratory distress.   Abdominal:      General: There is no distension.      Palpations: Abdomen is soft.   Skin:     General: Skin is warm and dry.   Neurological:      General: No focal deficit present.      Mental Status: He is alert and oriented to person, place, and time.         Vents:  Oxygen Concentration (%): 50 (07/25/20  0415)    Lines/Drains/Airways     Central Venous Catheter Line             Introducer with Double Lumen 07/24/20 0700 right internal jugular 1 day    Percutaneous Central Line Insertion/Assessment - Triple Lumen  07/24/20 0700 right internal jugular 1 day          Drain                 Urethral Catheter 07/24/20 0716 Non-latex;Straight-tip;Temperature probe 14 Fr. 1 day         Y Chest Tube 1 and 2 07/24/20 1447 1 Anterior Mediastinal 19 Fr. 2 Posterior Mediastinal 19 Fr. 1 day          Arterial Line                 Arterial Line 07/24/20 0704 Left Radial 1 day          Line                 Pacer Wires 07/24/20 1118 1 day          Peripheral Intravenous Line                 Peripheral IV - Single Lumen 07/24/20 0633 18 G Left Hand 1 day                Significant Labs:    CBC/Anemia Profile:  Recent Labs   Lab 07/24/20 1649 07/24/20 2002 07/25/20 0314 07/26/20  0235   WBC 19.81*  --   --  14.02* 15.36*   HGB 10.3*  --   --  10.2* 9.7*   HCT 30.4*   < > 31* 30.4* 28.5*     --   --  124* 105*   MCV 92  --   --  93 93   RDW 13.0  --   --  13.2 13.4    < > = values in this interval not displayed.        Chemistries:  Recent Labs   Lab 07/24/20 1649 07/25/20 0314 07/25/20 0845 07/25/20 2000 07/26/20  0235     --  142  --  137 139   K 4.4   < > 4.0 3.7 3.4* 3.9   *  --  111*  --  104 106   CO2 20*  --  24  --  27 26   BUN 17  --  16  --  13 14   CREATININE 1.4  --  1.1  --  0.9 0.9   CALCIUM 7.8*  --  7.8*  --  8.1* 7.9*   MG 3.1*  3.1*  --  2.2  --   --  1.8   PHOS 1.9*  1.9*  --  2.0*  --   --  2.0*    < > = values in this interval not displayed.         Significant Imaging:  I have reviewed all pertinent imaging results/findings within the past 24 hours.    Assessment/Plan:     * S/P mitral valve repair  S/P mitral valve repair  S/p MVR 7/24/20.      Neuro:   - Oxy 5/10 PRN for pain, dilaudid PRN for IV breakthrough   - Following commands, alert and conversant      Pulmonary:   - Extubated  7/24/20  - 3L NC  - Wean NC as tolerated  - PRN breathing treatments      Cardiac:  - MAP goal 60-80  - HDS off pressors  - Has been off cardene since early am 7/25  - CT 70cc/hr output overnight, 270 in 24 hrs, SS  - Will DC CT today  - Daily CXR  - CVP 9-10     Renal:   - -250 cc/hr, total 2767 / 24 hrs   - FB -2053cc / 24 hrs   - BUN 14 (13)/ Cr 0.9 (1.1)        Fluids/Electrolytes/Nutrition/GI:   -Nutritional status:enteral feeding  - CLD advanced to cardiac diet as tolerated with fluid restriction  -replace lytes PRN  -Bowel regimen     Hematology/Oncology:  -H/H 9.7 (10.2) / 28.5   - Plt 105  -Anticoagulation: ASA  - Statin      Infectious Disease:   -Afebrile  -WBC 15.36 (14.02)  -Abx: ancef     Endocrine:  -Glucose goal <180  - Accuchecks      Dispo:  - S/D to CSU      Primary: CTS            Critical care was time spent personally by me on the following activities: development of treatment plan with patient or surrogate and bedside caregivers, discussions with consultants, evaluation of patient's response to treatment, examination of patient, ordering and performing treatments and interventions, ordering and review of laboratory studies, ordering and review of radiographic studies, pulse oximetry, re-evaluation of patient's condition.  This critical care time did not overlap with that of any other provider or involve time for any procedures.     Samina Jay MD  Critical Care - Surgery  [facility]

## 2020-07-26 NOTE — PT/OT/SLP EVAL
"Physical Therapy Evaluation    Patient Name:  Kamari Merino   MRN:  [id number]    Recommendations:     Discharge Recommendations:  home with home health   Discharge Equipment Recommendations: none   Barriers to discharge: None    Assessment:     Kamari Merino is a 55 y.o. male admitted with a medical diagnosis of S/P mitral valve repair.  He presents with the following impairments/functional limitations:  weakness, impaired endurance, impaired balance, impaired functional mobilty, gait instability, pain, other (comment)(sternal precautions) . Patient adheres to sternal precautions for mobility, using pillow for splinting. Transfers w/ CG/min assist, ambulates forward and backward approx 4 feet x 5 for total approx 20 feet forward and backward, no LOB. Distance limited by lines..    Rehab Prognosis: Good; patient would benefit from acute skilled PT services to address these deficits and reach maximum level of function.    Recent Surgery: Procedure(s) (LRB):  VALVULOPLASTY, MITRAL VALVE (N/A) 2 Days Post-Op    Plan:     During this hospitalization, patient to be seen 4 x/week to address the identified rehab impairments via gait training, therapeutic activities, therapeutic exercises and progress toward the following goals:    · Plan of Care Expires:  08/25/20    Subjective     Chief Complaint: difficulty standing w/ sternal precautions  Patient/Family Comments/goals: return home to [facility]  Pain/Comfort:  · Pain Rating 1: other (see comments)(not rated, "a lttle")  · Location 1: sternal  · Pain Addressed 1: Distraction  · Pain Rating Post-Intervention 1: other (see comments)(a little)    Patients cultural, spiritual, Yazidism conflicts given the current situation:      Living Environment:  Patient lives w/ wife and children in 2  w/ 2 VERNELL. Bed/ bath are on first floor; does not go upstairs.   Prior to admission, patients level of function was independent, working, driving.  Equipment used at home: none.  DME owned (not " currently used): none.  Upon discharge, patient will have assistance from wife.    Objective:     Communicated with nurse prior to session.  Patient found up in chair with telemetry, pulse ox (continuous), blood pressure cuff, central line, chest tube, coy catheter, peripheral IV  upon PT entry to room.    General Precautions: Standard, fall, sternal   Orthopedic Precautions:N/A   Braces: N/A     Exams:  · Cognitive Exam:  Patient is oriented to Person, Place, Time and Situation  · Gross Motor Coordination:  WFL  · Postural Exam:  Patient presented with the following abnormalities:    · -       No postural abnormalities identified  · RLE ROM: WNL  · RLE Strength: WNL  · LLE ROM: WNL  · LLE Strength: WNL    Functional Mobility:  · Bed Mobility:  Not performed. Patient remained up in chair; educated verbally on bed mobility technique observing sternal precautions  · Transfers:     · Sit to Stand:  contact guard assistance and minimum assistance with no AD . Patient uses pillow for splinting/ sternal precautions; min assist for forward lean, scoot to edge of chair first trial, stands w/ CGA; second trial, CGA from chair, Wide ELIDA.   · Gait: w/ CGA w/ patient hugging pillow d/t sternal precautions forward and backward at chair 4 feet x 5 trials for total 20 feet forward and backward. No LOB.   · Balance: stands w/ CGA    Therapeutic Activities and Exercises:   patient educated on sternal precautions and can name. Educated on techniques to follow in functional mobilty.    AM-PAC 6 CLICK MOBILITY  Total Score:17     Patient left up in chair with all lines intact, call button in reach, nurse notified and wife present.    GOALS:   Multidisciplinary Problems     Physical Therapy Goals        Problem: Physical Therapy Goal    Goal Priority Disciplines Outcome Goal Variances Interventions   Physical Therapy Goal     PT, PT/OT Ongoing, Progressing     Description: Goals to be met by: 08/06/20     Patient will increase  functional independence with mobility by performin. Supine to sit with Stand-by Assistance and observing sternal precautions  2. Sit to supine with Stand-by Assistance and observing sternal precautions  3. Sit to stand transfer with Stand-by Assistance and observing sternal precautions  4. Bed to chair transfer with Stand-by Assistance  and observing sternal precautions  5. Gait  x 150 feet with Stand-by Assistance  and observing sternal precautions.   6. Ascend/Descend 6 inch curb step with Contact Guard Assistance/ hand held assistance, and observing sternal precautions.  7. Lower extremity exercise program x15 reps per handout, with assistance as needed                     History:     Past Medical History:   Diagnosis Date    Hypertension        History reviewed. No pertinent surgical history.    Time Tracking:     PT Received On: 20  PT Start Time: 1022     PT Stop Time: 1036  PT Total Time (min): 14 min     Billable Minutes: Evaluation 14      Nicole Munoz, PT  2020

## 2020-07-26 NOTE — PLAN OF CARE
AAOX4 and moves all extremities.  Pt OOB to chair x1 assist.    Killian  output. 100-250mL/hour.  Chest tube output 70 mL.  Electrolytes replaced as ordered.  Oxy IR 10mg given for complaint of pain x2. Awaiting floor bed, pt made aware of plan. Plan of care reviewed with Kamari Merino and verbalizes understanding.  Will continue to monitor

## 2020-07-26 NOTE — PLAN OF CARE
Plan of Care Note  Cardiothoracic Surgery    Kamari Merino is a 55 y.o. male with MR s/p mitral valve repair (7/24).    Specific issues:   --D/c CVC, coy and a-line  --continue lasix 40 bid, asa 325  --increase metop to 25  --to stepdown, pending bed availability  --encourage ambulation and IS    Plan of care for patient was discussed with ICU staff including nurses, residents, and faculty and appropriate consulting services.    Will continue to monitor patient's hemodynamics, functionality, neuro status, fluid status and renal function, and labs and will adjust medications and fluids as necessary while monitoring appropriateness for de-escalation of support and monitoring and transport to stepdown unit.    Shorty Perez MD  Cardiothoracic Surgery Fellow

## 2020-07-26 NOTE — PLAN OF CARE
Problem: Physical Therapy Goal  Goal: Physical Therapy Goal  Description: Goals to be met by: 20     Patient will increase functional independence with mobility by performin. Supine to sit with Stand-by Assistance and observing sternal precautions  2. Sit to supine with Stand-by Assistance and observing sternal precautions  3. Sit to stand transfer with Stand-by Assistance and observing sternal precautions  4. Bed to chair transfer with Stand-by Assistance  and observing sternal precautions  5. Gait  x 150 feet with Stand-by Assistance  and observing sternal precautions.   6. Ascend/Descend 6 inch curb step with Contact Guard Assistance/ hand held assistance, and observing sternal precautions.  7. Lower extremity exercise program x15 reps per handout, with assistance as needed    PT demetrius performed. Nicole Munoz, PT 2020

## 2020-07-26 NOTE — PLAN OF CARE
Shift events: OOBTC all shift, walked in room. De-lined.    Vitals: Tmax 100.4. All other VSS.    Neuro: AAOx4, follows commands appropriately.     Cardiac: MAPs maintained 60-80. HR NSR 80s.    Respiratory: RA to NC 1L today. Uses IS independently.    GI: Denies nausea. Tolerating cardiac diet. No BM. On bowel regimen.    : Killian D/C'd today. Due to void by 2100.    Skin: No skin breakdown noted to back of elbows, sacrum, or heels. Chair cushion in place. Chair locked. Non-skid socks in place. Call light within reach.

## 2020-07-26 NOTE — CARE UPDATE
BG goal 140-180    Remains in ICU, NAEON  BG well controlled without insulin  Febrile, tmax 100.4  Tolerating diet  Plan:    Continue moderate dose correction scale  BG monitoring AC/HS    Endocrine to continue to follow for now but will likely sign off soon if patient tolerating diet with no insulin needs given no history of DM    ** Please call Endocrine for any BG related issues **

## 2020-07-26 NOTE — ASSESSMENT & PLAN NOTE
S/P mitral valve repair  S/p MVR 7/24/20.      Neuro:   - Oxy 5/10 PRN for pain, dilaudid PRN for IV breakthrough   - Following commands, alert and conversant      Pulmonary:   - Extubated 7/24/20  - 3L NC  - Wean NC as tolerated  - PRN breathing treatments      Cardiac:  - MAP goal 60-80  - HDS off pressors  - Has been off cardene since early am 7/25  - CT 70cc/hr output overnight, 270 in 24 hrs, SS  - Will DC CT today  - Daily CXR  - CVP 9-10     Renal:   - -250 cc/hr, total 2767 / 24 hrs   - FB -2053cc / 24 hrs   - BUN 14 (13)/ Cr 0.9 (1.1)        Fluids/Electrolytes/Nutrition/GI:   -Nutritional status:enteral feeding  - CLD advanced to cardiac diet as tolerated with fluid restriction  -replace lytes PRN  -Bowel regimen     Hematology/Oncology:  -H/H 9.7 (10.2) / 28.5   - Plt 105  -Anticoagulation: ASA  - Statin      Infectious Disease:   -Afebrile  -WBC 15.36 (14.02)  -Abx: ancef     Endocrine:  -Glucose goal <180  - Accuchecks      Dispo:  - S/D to CSU      Primary: CTS

## 2020-07-26 NOTE — SUBJECTIVE & OBJECTIVE
Interval History/Significant Events: NAEO, VSS. -250cc/hr, CT 70 cc output overnight. Pain is well controlled, up to cardiac chair yesterday.     Follow-up For: Procedure(s) (LRB):  VALVULOPLASTY, MITRAL VALVE (N/A)    Post-Operative Day: 2 Days Post-Op    Objective:     Vital Signs (Most Recent):  Temp: 99.4 °F (37.4 °C) (07/26/20 0521)  Pulse: 84 (07/26/20 0600)  Resp: 15 (07/26/20 0600)  BP: 93/68 (07/26/20 0600)  SpO2: 97 % (07/26/20 0600) Vital Signs (24h Range):  Temp:  [99.4 °F (37.4 °C)-100.2 °F (37.9 °C)] 99.4 °F (37.4 °C)  Pulse:  [] 84  Resp:  [7-26] 15  SpO2:  [91 %-100 %] 97 %  BP: ()/(59-70) 93/68  Arterial Line BP: (106-127)/(43-52) 111/52     Weight: 95.4 kg (210 lb 5.1 oz)  Body mass index is 33.95 kg/m².      Intake/Output Summary (Last 24 hours) at 7/26/2020 0629  Last data filed at 7/26/2020 0600  Gross per 24 hour   Intake 1493.95 ml   Output 3197 ml   Net -1703.05 ml       Physical Exam  Vitals signs and nursing note reviewed.   Constitutional:       General: He is not in acute distress.  Cardiovascular:      Rate and Rhythm: Normal rate.      Pulses: Normal pulses.      Comments: Sternotomy c/d/i with overlying dressing. CT with 270 cc output  Pulmonary:      Effort: Pulmonary effort is normal. No respiratory distress.   Abdominal:      General: There is no distension.      Palpations: Abdomen is soft.   Skin:     General: Skin is warm and dry.   Neurological:      General: No focal deficit present.      Mental Status: He is alert and oriented to person, place, and time.         Vents:  Oxygen Concentration (%): 50 (07/25/20 0415)    Lines/Drains/Airways     Central Venous Catheter Line             Introducer with Double Lumen 07/24/20 0700 right internal jugular 1 day    Percutaneous Central Line Insertion/Assessment - Triple Lumen  07/24/20 0700 right internal jugular 1 day          Drain                 Urethral Catheter 07/24/20 0716 Non-latex;Straight-tip;Temperature  probe 14 Fr. 1 day         Y Chest Tube 1 and 2 07/24/20 1447 1 Anterior Mediastinal 19 Fr. 2 Posterior Mediastinal 19 Fr. 1 day          Arterial Line                 Arterial Line 07/24/20 0704 Left Radial 1 day          Line                 Pacer Wires 07/24/20 1118 1 day          Peripheral Intravenous Line                 Peripheral IV - Single Lumen 07/24/20 0633 18 G Left Hand 1 day                Significant Labs:    CBC/Anemia Profile:  Recent Labs   Lab 07/24/20 1649 07/24/20 2002 07/25/20 0314 07/26/20  0235   WBC 19.81*  --   --  14.02* 15.36*   HGB 10.3*  --   --  10.2* 9.7*   HCT 30.4*   < > 31* 30.4* 28.5*     --   --  124* 105*   MCV 92  --   --  93 93   RDW 13.0  --   --  13.2 13.4    < > = values in this interval not displayed.        Chemistries:  Recent Labs   Lab 07/24/20 1649 07/25/20 0314 07/25/20  0845 07/25/20 2000 07/26/20  0235     --  142  --  137 139   K 4.4   < > 4.0 3.7 3.4* 3.9   *  --  111*  --  104 106   CO2 20*  --  24  --  27 26   BUN 17  --  16  --  13 14   CREATININE 1.4  --  1.1  --  0.9 0.9   CALCIUM 7.8*  --  7.8*  --  8.1* 7.9*   MG 3.1*  3.1*  --  2.2  --   --  1.8   PHOS 1.9*  1.9*  --  2.0*  --   --  2.0*    < > = values in this interval not displayed.         Significant Imaging:  I have reviewed all pertinent imaging results/findings within the past 24 hours.

## 2020-07-27 LAB
ANION GAP SERPL CALC-SCNC: 5 MMOL/L (ref 8–16)
BASOPHILS # BLD AUTO: 0.07 K/UL (ref 0–0.2)
BASOPHILS NFR BLD: 0.6 % (ref 0–1.9)
BUN SERPL-MCNC: 16 MG/DL (ref 6–20)
CALCIUM SERPL-MCNC: 8.2 MG/DL (ref 8.7–10.5)
CHLORIDE SERPL-SCNC: 101 MMOL/L (ref 95–110)
CO2 SERPL-SCNC: 30 MMOL/L (ref 23–29)
CREAT SERPL-MCNC: 1.1 MG/DL (ref 0.5–1.4)
DIFFERENTIAL METHOD: ABNORMAL
EOSINOPHIL # BLD AUTO: 0.2 K/UL (ref 0–0.5)
EOSINOPHIL NFR BLD: 1.6 % (ref 0–8)
ERYTHROCYTE [DISTWIDTH] IN BLOOD BY AUTOMATED COUNT: 13.1 % (ref 11.5–14.5)
EST. GFR  (AFRICAN AMERICAN): >60 ML/MIN/1.73 M^2
EST. GFR  (NON AFRICAN AMERICAN): >60 ML/MIN/1.73 M^2
GLUCOSE SERPL-MCNC: 109 MG/DL (ref 70–110)
HCT VFR BLD AUTO: 30.9 % (ref 40–54)
HGB BLD-MCNC: 10 G/DL (ref 14–18)
IMM GRANULOCYTES # BLD AUTO: 0.04 K/UL (ref 0–0.04)
IMM GRANULOCYTES NFR BLD AUTO: 0.3 % (ref 0–0.5)
LYMPHOCYTES # BLD AUTO: 2.1 K/UL (ref 1–4.8)
LYMPHOCYTES NFR BLD: 17.7 % (ref 18–48)
MAGNESIUM SERPL-MCNC: 2.1 MG/DL (ref 1.6–2.6)
MCH RBC QN AUTO: 30.7 PG (ref 27–31)
MCHC RBC AUTO-ENTMCNC: 32.4 G/DL (ref 32–36)
MCV RBC AUTO: 95 FL (ref 82–98)
MONOCYTES # BLD AUTO: 0.8 K/UL (ref 0.3–1)
MONOCYTES NFR BLD: 7.1 % (ref 4–15)
NEUTROPHILS # BLD AUTO: 8.6 K/UL (ref 1.8–7.7)
NEUTROPHILS NFR BLD: 72.7 % (ref 38–73)
NRBC BLD-RTO: 0 /100 WBC
PHOSPHATE SERPL-MCNC: 2.3 MG/DL (ref 2.7–4.5)
PLATELET # BLD AUTO: 121 K/UL (ref 150–350)
PMV BLD AUTO: 12.1 FL (ref 9.2–12.9)
POTASSIUM SERPL-SCNC: 3.9 MMOL/L (ref 3.5–5.1)
POTASSIUM SERPL-SCNC: 4.4 MMOL/L (ref 3.5–5.1)
RBC # BLD AUTO: 3.26 M/UL (ref 4.6–6.2)
SODIUM SERPL-SCNC: 136 MMOL/L (ref 136–145)
WBC # BLD AUTO: 11.77 K/UL (ref 3.9–12.7)

## 2020-07-27 PROCEDURE — 85025 COMPLETE CBC W/AUTO DIFF WBC: CPT

## 2020-07-27 PROCEDURE — 97530 THERAPEUTIC ACTIVITIES: CPT

## 2020-07-27 PROCEDURE — 99233 SBSQ HOSP IP/OBS HIGH 50: CPT | Mod: ,,, | Performed by: SURGERY

## 2020-07-27 PROCEDURE — 25000003 PHARM REV CODE 250: Performed by: THORACIC SURGERY (CARDIOTHORACIC VASCULAR SURGERY)

## 2020-07-27 PROCEDURE — 25000003 PHARM REV CODE 250: Performed by: NURSE PRACTITIONER

## 2020-07-27 PROCEDURE — 99233 PR SUBSEQUENT HOSPITAL CARE,LEVL III: ICD-10-PCS | Mod: ,,, | Performed by: SURGERY

## 2020-07-27 PROCEDURE — 97535 SELF CARE MNGMENT TRAINING: CPT

## 2020-07-27 PROCEDURE — 84100 ASSAY OF PHOSPHORUS: CPT

## 2020-07-27 PROCEDURE — 94761 N-INVAS EAR/PLS OXIMETRY MLT: CPT

## 2020-07-27 PROCEDURE — 97537 COMMUNITY/WORK REINTEGRATION: CPT

## 2020-07-27 PROCEDURE — 80048 BASIC METABOLIC PNL TOTAL CA: CPT

## 2020-07-27 PROCEDURE — 63600175 PHARM REV CODE 636 W HCPCS: Performed by: STUDENT IN AN ORGANIZED HEALTH CARE EDUCATION/TRAINING PROGRAM

## 2020-07-27 PROCEDURE — 25000003 PHARM REV CODE 250: Performed by: STUDENT IN AN ORGANIZED HEALTH CARE EDUCATION/TRAINING PROGRAM

## 2020-07-27 PROCEDURE — 99900035 HC TECH TIME PER 15 MIN (STAT)

## 2020-07-27 PROCEDURE — 84132 ASSAY OF SERUM POTASSIUM: CPT

## 2020-07-27 PROCEDURE — 97116 GAIT TRAINING THERAPY: CPT

## 2020-07-27 PROCEDURE — 20000000 HC ICU ROOM

## 2020-07-27 PROCEDURE — 83735 ASSAY OF MAGNESIUM: CPT

## 2020-07-27 RX ORDER — PANTOPRAZOLE SODIUM 40 MG/1
40 TABLET, DELAYED RELEASE ORAL DAILY
Status: DISCONTINUED | OUTPATIENT
Start: 2020-07-27 | End: 2020-07-28 | Stop reason: HOSPADM

## 2020-07-27 RX ORDER — POTASSIUM CHLORIDE 20 MEQ/1
20 TABLET, EXTENDED RELEASE ORAL ONCE
Status: DISCONTINUED | OUTPATIENT
Start: 2020-07-27 | End: 2020-07-28 | Stop reason: HOSPADM

## 2020-07-27 RX ADMIN — FUROSEMIDE 40 MG: 10 INJECTION, SOLUTION INTRAVENOUS at 08:07

## 2020-07-27 RX ADMIN — DOCUSATE SODIUM 100 MG: 100 CAPSULE, LIQUID FILLED ORAL at 08:07

## 2020-07-27 RX ADMIN — SODIUM PHOSPHATE, MONOBASIC, MONOHYDRATE 15 MMOL: 276; 142 INJECTION, SOLUTION INTRAVENOUS at 06:07

## 2020-07-27 RX ADMIN — METOPROLOL TARTRATE 25 MG: 25 TABLET, FILM COATED ORAL at 08:07

## 2020-07-27 RX ADMIN — MUPIROCIN 1 G: 20 OINTMENT TOPICAL at 08:07

## 2020-07-27 RX ADMIN — POTASSIUM CHLORIDE 20 MEQ: 1500 TABLET, EXTENDED RELEASE ORAL at 08:07

## 2020-07-27 RX ADMIN — PANTOPRAZOLE SODIUM 40 MG: 40 TABLET, DELAYED RELEASE ORAL at 08:07

## 2020-07-27 RX ADMIN — POLYETHYLENE GLYCOL 3350 17 G: 17 POWDER, FOR SOLUTION ORAL at 08:07

## 2020-07-27 RX ADMIN — MAGNESIUM OXIDE 400 MG (241.3 MG MAGNESIUM) TABLET 400 MG: TABLET at 08:07

## 2020-07-27 RX ADMIN — OXYCODONE HYDROCHLORIDE 10 MG: 10 TABLET ORAL at 08:07

## 2020-07-27 RX ADMIN — ASPIRIN 325 MG ORAL TABLET 325 MG: 325 PILL ORAL at 08:07

## 2020-07-27 NOTE — ASSESSMENT & PLAN NOTE
S/P mitral valve repair  S/p MVR 7/24/20.      Neuro:   - Oxy 5/10 PRN for pain, dilaudid PRN for IV breakthrough   - Following commands, alert and conversant      Pulmonary:   - Extubated 7/24/20  - 1L NC  - Wean NC as tolerated  - PRN breathing treatments      Cardiac:  - MAP goal 60-80  - HDS off pressors  - Has been off cardene since early am 7/25  - CT 110cc/hr output in 24 hrs, SS, d/c today  - Daily CXR     Renal:   - UOP 3600cc / 24 hrs   - FB -2835cc / 24 hrs   - BUN 16 (14)/ Cr 1.1 (0.9)        Fluids/Electrolytes/Nutrition/GI:   -Nutritional status:enteral feeding  - Cardiac diet as tolerated with fluid restriction  -replace lytes PRN  -Bowel regimen     Hematology/Oncology:  -H/H 10.0 (9.7) / 30.9   - Plt 121  -Anticoagulation: ASA  - Statin      Infectious Disease:   -Afebrile  -WBC 11.8 (15.4)  -Abx: post op course of ancef     Endocrine:  -Glucose goal <180  - Accuchecks      Dispo:  - S/D to CSU      Primary: CTS

## 2020-07-27 NOTE — PLAN OF CARE
Pt goals remain appropriate, continue w/ OT POC.    Problem: Occupational Therapy Goal  Goal: Occupational Therapy Goal  Description: Goals to be met by: 8/5/20    Patient will increase functional independence with ADLs by performing:    UE Dressing with [place].  LE Dressing with [place].  Grooming while standing at sink with Modified [place]. Met 7/27 ETELVINA  Toileting from toilet with Modified [place] for hygiene and clothing management.   Step transfer with Modified [place] with no AD to prepare for household mobility to complete occupations of choice  Toilet transfer to toilet with Modified [place].  Pt will adhere to 3/3 sternal precautions for 3 consecutive sessions without any verbal cues.     Outcome: Ongoing, Progressing   Chris RUST  7/27/2020

## 2020-07-27 NOTE — SUBJECTIVE & OBJECTIVE
Interval History/Significant Events: NAEO, VSS. UOP 3600cc/24hrs,  cc output over 24hrs. Pain is well controlled..     Follow-up For: Procedure(s) (LRB):  VALVULOPLASTY, MITRAL VALVE (N/A)    Post-Operative Day: 2 Days Post-Op    Objective:     Vital Signs (Most Recent):  Temp: 99.7 °F (37.6 °C) (07/27/20 0700)  Pulse: 89 (07/27/20 0830)  Resp: 16 (07/27/20 0841)  BP: 108/61 (07/27/20 0700)  SpO2: 99 % (07/27/20 0830) Vital Signs (24h Range):  Temp:  [97.9 °F (36.6 °C)-99.7 °F (37.6 °C)] 99.7 °F (37.6 °C)  Pulse:  [] 89  Resp:  [10-33] 16  SpO2:  [92 %-100 %] 99 %  BP: (103-128)/(61-79) 108/61     Weight: 95.4 kg (210 lb 5.1 oz)  Body mass index is 33.95 kg/m².      Intake/Output Summary (Last 24 hours) at 7/27/2020 1030  Last data filed at 7/27/2020 0620  Gross per 24 hour   Intake 670 ml   Output 3670 ml   Net -3000 ml       Physical Exam  Vitals signs and nursing note reviewed.   Constitutional:       General: He is not in acute distress.  Cardiovascular:      Rate and Rhythm: Normal rate.      Pulses: Normal pulses.      Comments: Sternotomy c/d/i with overlying dressing. CT with 110 cc output  Pulmonary:      Effort: Pulmonary effort is normal. No respiratory distress.   Abdominal:      General: There is no distension.      Palpations: Abdomen is soft.   Skin:     General: Skin is warm and dry.   Neurological:      General: No focal deficit present.      Mental Status: He is alert and oriented to person, place, and time.         Vents:  Oxygen Concentration (%): 24 (07/27/20 0345)    Lines/Drains/Airways     Drain                 Y Chest Tube 1 and 2 07/24/20 1447 1 Anterior Mediastinal 19 Fr. 2 Posterior Mediastinal 19 Fr. 2 days          Line                 Pacer Wires 07/24/20 1118 2 days          Peripheral Intravenous Line                 Peripheral IV - Single Lumen 07/26/20 1100 20 G Right Wrist less than 1 day         Peripheral IV - Single Lumen 07/27/20 0723 20 G Right Antecubital less  than 1 day                Significant Labs:    CBC/Anemia Profile:  Recent Labs   Lab 07/26/20 0235 07/27/20 0359   WBC 15.36* 11.77   HGB 9.7* 10.0*   HCT 28.5* 30.9*   * 121*   MCV 93 95   RDW 13.4 13.1        Chemistries:  Recent Labs   Lab 07/25/20 2000 07/26/20 0235 07/26/20 2014 07/27/20  0359    139  --  136   K 3.4* 3.9 3.9 4.4    106  --  101   CO2 27 26  --  30*   BUN 13 14  --  16   CREATININE 0.9 0.9  --  1.1   CALCIUM 8.1* 7.9*  --  8.2*   MG  --  1.8  --  2.1   PHOS  --  2.0*  --  2.3*         Significant Imaging:  I have reviewed all pertinent imaging results/findings within the past 24 hours.

## 2020-07-27 NOTE — PLAN OF CARE
SW spoke with patient to complete assessment.     Pt lives with spouse and 13 yr old son. There are 2 steps to enter home. Patient was independent with ADL's prior to hospital stay. HH is recommended for discharge. Patient does not have a preference for HH agency.     PCP is Dr. Ruel Huizar with [facility]. Preferred pharmacy is [facility] in [place], [place].     07/27/20 1108   Discharge Assessment   Assessment Type Discharge Planning Assessment   Confirmed/corrected address and phone number on facesheet? Yes   Assessment information obtained from? Patient   Prior to hospitilization cognitive status: Alert/Oriented   Prior to hospitalization functional status: Independent   Current cognitive status: Alert/Oriented   Current Functional Status: Independent   Facility Arrived From: Arrived from [facility] clinic   Lives With child(tyron), dependent;spouse   Able to Return to Prior Arrangements yes   Is patient able to care for self after discharge? Yes   Who are your caregiver(s) and their phone number(s)? Spouse Emilie Benites ([phone])   Patient's perception of discharge disposition admitted as an inpatient   Readmission Within the Last 30 Days no previous admission in last 30 days   Patient currently being followed by outpatient case management? No   Patient currently receives any other outside agency services? No   Equipment Currently Used at Home none   Do you have any problems affording any of your prescribed medications? No   Is the patient taking medications as prescribed? yes   Does the patient have transportation home? Yes   Transportation Anticipated family or friend will provide   Does the patient receive services at the Coumadin Clinic? No   Discharge Plan A Home with family;Home Health   Discharge Plan B Home with family   Patient/Family in Agreement with Plan yes     Patti Mccray LMSW  [facility]

## 2020-07-27 NOTE — PROGRESS NOTES
[facility]  Critical Care - Surgery  Progress Note    Patient Name: Kamari Merino  MRN: [id number]  Admission Date: 7/24/2020  Hospital Length of Stay: 3 days  Code Status: Full Code  Attending Provider: Cornel Guzman MD  Primary Care Provider: Primary Doctor No   Principal Problem: S/P mitral valve repair    Subjective:     Hospital/ICU Course:  No notes on file    Interval History/Significant Events: NAEO, VSS. UOP 3600cc/24hrs,  cc output over 24hrs. Pain is well controlled..     Follow-up For: Procedure(s) (LRB):  VALVULOPLASTY, MITRAL VALVE (N/A)    Post-Operative Day: 2 Days Post-Op    Objective:     Vital Signs (Most Recent):  Temp: 99.7 °F (37.6 °C) (07/27/20 0700)  Pulse: 89 (07/27/20 0830)  Resp: 16 (07/27/20 0841)  BP: 108/61 (07/27/20 0700)  SpO2: 99 % (07/27/20 0830) Vital Signs (24h Range):  Temp:  [97.9 °F (36.6 °C)-99.7 °F (37.6 °C)] 99.7 °F (37.6 °C)  Pulse:  [] 89  Resp:  [10-33] 16  SpO2:  [92 %-100 %] 99 %  BP: (103-128)/(61-79) 108/61     Weight: 95.4 kg (210 lb 5.1 oz)  Body mass index is 33.95 kg/m².      Intake/Output Summary (Last 24 hours) at 7/27/2020 1030  Last data filed at 7/27/2020 0620  Gross per 24 hour   Intake 670 ml   Output 3670 ml   Net -3000 ml       Physical Exam  Vitals signs and nursing note reviewed.   Constitutional:       General: He is not in acute distress.  Cardiovascular:      Rate and Rhythm: Normal rate.      Pulses: Normal pulses.      Comments: Sternotomy c/d/i with overlying dressing. CT with 110 cc output  Pulmonary:      Effort: Pulmonary effort is normal. No respiratory distress.   Abdominal:      General: There is no distension.      Palpations: Abdomen is soft.   Skin:     General: Skin is warm and dry.   Neurological:      General: No focal deficit present.      Mental Status: He is alert and oriented to person, place, and time.         Vents:  Oxygen Concentration (%): 24 (07/27/20 0345)    Lines/Drains/Airways     Drain                  Y Chest Tube 1 and 2 07/24/20 1447 1 Anterior Mediastinal 19 Fr. 2 Posterior Mediastinal 19 Fr. 2 days          Line                 Pacer Wires 07/24/20 1118 2 days          Peripheral Intravenous Line                 Peripheral IV - Single Lumen 07/26/20 1100 20 G Right Wrist less than 1 day         Peripheral IV - Single Lumen 07/27/20 0723 20 G Right Antecubital less than 1 day                Significant Labs:    CBC/Anemia Profile:  Recent Labs   Lab 07/26/20 0235 07/27/20  0359   WBC 15.36* 11.77   HGB 9.7* 10.0*   HCT 28.5* 30.9*   * 121*   MCV 93 95   RDW 13.4 13.1        Chemistries:  Recent Labs   Lab 07/25/20 2000 07/26/20 0235 07/26/20 2014 07/27/20  0359    139  --  136   K 3.4* 3.9 3.9 4.4    106  --  101   CO2 27 26  --  30*   BUN 13 14  --  16   CREATININE 0.9 0.9  --  1.1   CALCIUM 8.1* 7.9*  --  8.2*   MG  --  1.8  --  2.1   PHOS  --  2.0*  --  2.3*         Significant Imaging:  I have reviewed all pertinent imaging results/findings within the past 24 hours.    Assessment/Plan:     * S/P mitral valve repair  S/P mitral valve repair  S/p MVR 7/24/20.      Neuro:   - Oxy 5/10 PRN for pain, dilaudid PRN for IV breakthrough   - Following commands, alert and conversant      Pulmonary:   - Extubated 7/24/20  - 1L NC  - Wean NC as tolerated  - PRN breathing treatments      Cardiac:  - MAP goal 60-80  - HDS off pressors  - Has been off cardene since early am 7/25  - CT 110cc/hr output in 24 hrs, SS, d/c today  - Daily CXR     Renal:   - UOP 3600cc / 24 hrs   - FB -2835cc / 24 hrs   - BUN 16 (14)/ Cr 1.1 (0.9)        Fluids/Electrolytes/Nutrition/GI:   -Nutritional status:enteral feeding  - Cardiac diet as tolerated with fluid restriction  -replace lytes PRN  -Bowel regimen     Hematology/Oncology:  -H/H 10.0 (9.7) / 30.9   - Plt 121  -Anticoagulation: ASA  - Statin      Infectious Disease:   -Afebrile  -WBC 11.8 (15.4)  -Abx: post op course of  ancef     Endocrine:  -Glucose goal <180  - Accuchecks      Dispo:  - S/D to [facility]      Primary: CTS         Critical care was time spent personally by me on the following activities: development of treatment plan with patient or surrogate and bedside caregivers, discussions with consultants, evaluation of patient's response to treatment, examination of patient, ordering and performing treatments and interventions, ordering and review of laboratory studies, ordering and review of radiographic studies, pulse oximetry, re-evaluation of patient's condition.  This critical care time did not overlap with that of any other provider or involve time for any procedures.     Sonny Olivier MD  Critical Care - Surgery  [facility] [address] [facility]

## 2020-07-27 NOTE — PT/OT/SLP PROGRESS
Occupational Therapy   Treatment    Name: Kamari Merino  MRN: [id number]  Admitting Diagnosis:  S/P mitral valve repair  3 Days Post-Op    Recommendations:     Discharge Recommendations: home health OT  Discharge Equipment Recommendations:  none  Barriers to discharge:  None    Assessment:     Kamari Merino is a 55 y.o. male with a medical diagnosis of S/P mitral valve repair.  He presents with improved mobility and ability to perform selfcare. Performance deficits affecting function are decreased upper extremity function, impaired cardiopulmonary response to activity, impaired functional mobilty, impaired endurance, impaired skin.     Rehab Prognosis:  Good; patient would benefit from acute skilled OT services to address these deficits and reach maximum level of function.       Plan:     Patient to be seen 5 x/week to address the above listed problems via self-care/home management, therapeutic activities, therapeutic exercises  · Plan of Care Expires: 08/23/20  · Plan of Care Reviewed with: patient, spouse    Subjective     Pain/Comfort:  · Pain Rating 1: 0/10  · Pain Rating Post-Intervention 1: 0/10    Objective:     Communicated with: RN prior to session.  Patient found up in chair with telemetry, pulse ox (continuous), blood pressure cuff, peripheral IV, chest tube, oxygen upon OT entry to room.    General Precautions: Standard, fall, sternal   Orthopedic Precautions:N/A   Braces: N/A     Occupational Performance:     Bed Mobility:    · Pt sitting in chair upon entry.     Functional Mobility/Transfers:  · Patient completed Sit <> Stand Transfer with stand by assistance  with  no assistive device   Functional Mobility:  Pt was able to walk to/from bathroom w/ SBA, and assistance w/ lines/drains.      Activities of Daily Living:  · Grooming: supervision standing at sink  · Bathing: supervision standing at sink to wash hands.   · Toileting: supervision standing at bedside to use urinal.       [facility] 6 Click ADL:  21    Treatment & Education:  -Pt edu on OT role/POC  -Importance of OOB activity with staff assistance  -Safety during functional t/f and mobility  - White board updated  - Multiple self care tasks/functional mobility completed-- assistance level noted above  - All questions/concerns answered within OT scope of practice    Educated Pt on sternal precautions (ID'd 2/3 correct missed lifting no more than 5 lbs.).  Discussed step down and progress in rehabilitation.     Patient left up in chair with all lines intact, call button in reach, RN notified and spouse presentEducation:      GOALS:   Multidisciplinary Problems     Occupational Therapy Goals        Problem: Occupational Therapy Goal    Goal Priority Disciplines Outcome Interventions   Occupational Therapy Goal     OT, PT/OT Ongoing, Progressing    Description: Goals to be met by: 8/5/20    Patient will increase functional independence with ADLs by performing:    UE Dressing with [place].  LE Dressing with [place].  Grooming while standing at sink with Modified [place]. Met 7/27 ETELVINA  Toileting from toilet with Modified [place] for hygiene and clothing management.   Step transfer with Modified [place] with no AD to prepare for household mobility to complete occupations of choice  Toilet transfer to toilet with Modified [place].  Pt will adhere to 3/3 sternal precautions for 3 consecutive sessions without any verbal cues.                      Time Tracking:     OT Date of Treatment: 07/27/20  OT Start Time: 1523  OT Stop Time: 1548  OT Total Time (min): 25 min    Billable Minutes:Self Care/Home Management 15 mins  Therapeutic Activity 10 isidro Sorto, OT  7/27/2020

## 2020-07-27 NOTE — PLAN OF CARE
Plan of care reviewed with patient and spouse at bedside. Pt remains stable on room air, HR 80's, -120/60, afebrile. UO adequate, voids spontaneously in urinal. BM today. Walked in halls with PT. OOBTC throughout duration of shift. Chest tubes and pacer wires removed by MD. Pain well controlled. No events this shift. See flowsheet for full assessment.

## 2020-07-27 NOTE — ANESTHESIA POSTPROCEDURE EVALUATION
Anesthesia Post Evaluation    Patient: Kamari Merino    Procedure(s) Performed: Procedure(s) (LRB):  VALVULOPLASTY, MITRAL VALVE (N/A)    Final Anesthesia Type: general    Patient location during evaluation: ICU  Patient participation: Yes- Able to Participate  Level of consciousness: awake and alert and oriented  Post-procedure vital signs: reviewed and stable  Pain management: adequate  Airway patency: patent  NINA mitigation strategies: Intraoperative administration of CPAP, nasopharyngeal airway, or oral appliance during sedation, Multimodal analgesia, Extubation while patient is awake, Verification of full reversal of neuromuscular block and Extubation and recovery carried out in lateral, semiupright, or other nonsupine position  PONV status at discharge: No PONV  Anesthetic complications: no      Cardiovascular status: hemodynamically stable  Respiratory status: unassisted  Hydration status: euvolemic  Follow-up not needed.          Vitals Value Taken Time   /67 07/27/20 0802   Temp 37.6 °C (99.7 °F) 07/27/20 0700   Pulse 89 07/27/20 0850   Resp 21 07/27/20 0850   SpO2 98 % 07/27/20 0850   Vitals shown include unvalidated device data.      No case tracking events are documented in the log.      Pain/[place] Score: Pain Rating Prior to Med Admin: 5 (7/27/2020  8:41 AM)  Pain Rating Post Med Admin: 3 (7/26/2020  6:10 AM)

## 2020-07-27 NOTE — PLAN OF CARE
BREONNA will work with patient, family and insurance to achieve HH once orders are placed as patient approaches discharge.       07/27/20 1040   Post-Acute Status   Post-Acute Authorization Home Health   Home Health Status Awaiting Orders for HH   Discharge Plan   Discharge Plan A Home Health   Discharge Plan B Home with family     Layne Martinez LMSW   - Case Management

## 2020-07-27 NOTE — PT/OT/SLP PROGRESS
Physical Therapy Treatment    Patient Name:  Kamari Merino   MRN:  [id number]  Admit Date: 2020  Admitting Diagnosis:  S/P mitral valve repair   Length of Stay: 3 days  Recent Surgery: Procedure(s) (LRB):  VALVULOPLASTY, MITRAL VALVE (N/A) 3 Days Post-Op    Recommendations:     Discharge Recommendations:  home health PT   Discharge Equipment Recommendations: none   Barriers to discharge: None    Plan:     During this hospitalization, patient to be seen 4 x/week to address the listed problems via gait training, therapeutic activities, therapeutic exercises, neuromuscular re-education  · Plan of Care Expires:  20   Plan of Care Reviewed with: patient, spouse    Assessment:     Kamari Merino is a 55 y.o. male admitted with a medical diagnosis of S/P mitral valve repair. Pt progressing towards goals, but not at PLOF. Pt tolerated session well with no increase in pain/discomfort.  Pt is improving with therapy evidenced by increased gait distance. Pt would benefit from continued PT services to improve overall functional mobility. Recommend d/c to HHPT to maximize functional independence.      Problem List: impaired endurance, impaired functional mobilty, decreased upper extremity function, impaired cardiopulmonary response to activity.  Rehab Prognosis: Good     GOALS:   Multidisciplinary Problems     Physical Therapy Goals        Problem: Physical Therapy Goal    Goal Priority Disciplines Outcome Goal Variances Interventions   Physical Therapy Goal     PT, PT/OT Ongoing, Progressing     Description: Goals to be met by: 20     Patient will increase functional independence with mobility by performin. Supine to sit with Stand-by Assistance and observing sternal precautions  2. Sit to supine with Stand-by Assistance and observing sternal precautions  3. Sit to stand transfer with Stand-by Assistance and observing sternal precautions  4. Bed to chair transfer with Stand-by Assistance  and observing sternal  precautions  5. Gait  x 150 feet with Stand-by Assistance  and observing sternal precautions.   6. Ascend/Descend 6 inch curb step with Contact Guard Assistance/ hand held assistance, and observing sternal precautions.  7. Lower extremity exercise program x15 reps per handout, with assistance as needed                     Subjective   Communicated with RN prior to session.  Patient found up in chair upon PT entry to room, agreeable to evaluation. Kamari Merino's wife present during session.    Chief Complaint: none reported   Patient/Family Comments/goals: to get better and return home   Pain/Comfort:  · Pain Rating 1: 0/10  · Pain Rating Post-Intervention 1: 0/10    Objective:   Patient found with: telemetry, pulse ox (continuous), blood pressure cuff, peripheral IV, chest tube, oxygen(external pacer)   General Precautions: Standard, Cardiac fall, sternal   Orthopedic Precautions:N/A   Braces: N/A   Oxygen Device: Room Air  Vitals: /60   Pulse 83   Temp 98.5 °F (36.9 °C) (Oral)   Resp 19   Wt 95.4 kg (210 lb 5.1 oz)   SpO2 99%   BMI 33.95 kg/m²     Outcome Measures:  AM-PAC 6 CLICK MOBILITY  Turning over in bed (including adjusting bedclothes, sheets and blankets)?: 3  Sitting down on and standing up from a chair with arms (e.g., wheelchair, bedside commode, etc.): 4  Moving from lying on back to sitting on the side of the bed?: 3  Moving to and from a bed to a chair (including a wheelchair)?: 3  Need to walk in hospital room?: 3  Climbing 3-5 steps with a railing?: 3  Basic Mobility Total Score: 19       Functional Mobility:  Additional staff present: OT  Bed Mobility:   Not performed 2nd to pt found in chair     Transfers:    Sit <> Stand Transfer: supervision with no assistive device from chair      Gait:  · Patient ambulated: 150ft   · Patient required: standy by assistance  · Patient used: no assistive device  · Gait Pattern observed: reciprocal gait  · Comments:   · Portable monitor intact and RN  present; mask donned  · No LOB  · Increased SOB throughout ambulation      Therapeutic Activities, Exercises, and Education:   Educated pt on PT role/POC  Educated pt on importance of OOB activity and daily ambulation   Educated pt on sternal precautions  Pt verbalized understanding      Patient left up in chair with all lines intact, call button in reach, RN notified and wife present..    Time Tracking:     PT Received On: 07/27/20  PT Start Time: 1425     PT Stop Time: 1450  PT Total Time (min): 25 min     Billable Minutes:   · Gait Training 23    Treatment Type: Treatment  PT/PTA: PT       Marian Bryant, PT, DPT  7/27/2020  [phone]

## 2020-07-28 VITALS
DIASTOLIC BLOOD PRESSURE: 77 MMHG | SYSTOLIC BLOOD PRESSURE: 111 MMHG | HEART RATE: 90 BPM | OXYGEN SATURATION: 99 % | TEMPERATURE: 99 F | WEIGHT: 210 LBS | BODY MASS INDEX: 33.75 KG/M2 | RESPIRATION RATE: 17 BRPM | HEIGHT: 66 IN

## 2020-07-28 LAB
ALBUMIN SERPL BCP-MCNC: 3.3 G/DL (ref 3.5–5.2)
ALP SERPL-CCNC: 78 U/L (ref 55–135)
ALT SERPL W/O P-5'-P-CCNC: 27 U/L (ref 10–44)
ANION GAP SERPL CALC-SCNC: 9 MMOL/L (ref 8–16)
ASCENDING AORTA: 3.47 CM
AST SERPL-CCNC: 39 U/L (ref 10–40)
AV INDEX (PROSTH): 1.25
AV MEAN GRADIENT: 3 MMHG
AV PEAK GRADIENT: 4 MMHG
AV VALVE AREA: 4.84 CM2
AV VELOCITY RATIO: 0.99
BASOPHILS # BLD AUTO: 0.05 K/UL (ref 0–0.2)
BASOPHILS NFR BLD: 0.5 % (ref 0–1.9)
BILIRUB SERPL-MCNC: 0.8 MG/DL (ref 0.1–1)
BSA FOR ECHO PROCEDURE: 2.11 M2
BUN SERPL-MCNC: 19 MG/DL (ref 6–20)
CALCIUM SERPL-MCNC: 9.3 MG/DL (ref 8.7–10.5)
CHLORIDE SERPL-SCNC: 102 MMOL/L (ref 95–110)
CO2 SERPL-SCNC: 27 MMOL/L (ref 23–29)
CREAT SERPL-MCNC: 1 MG/DL (ref 0.5–1.4)
CV ECHO LV RWT: 0.31 CM
DIFFERENTIAL METHOD: ABNORMAL
DOP CALC AO PEAK VEL: 0.95 M/S
DOP CALC AO VTI: 13.89 CM
DOP CALC LVOT AREA: 3.9 CM2
DOP CALC LVOT DIAMETER: 2.22 CM
DOP CALC LVOT PEAK VEL: 0.94 M/S
DOP CALC LVOT STROKE VOLUME: 67.28 CM3
DOP CALCLVOT PEAK VEL VTI: 17.39 CM
E WAVE DECELERATION TIME: 393.92 MSEC
E/A RATIO: 0.78
E/E' RATIO: 15.75 M/S
ECHO LV POSTERIOR WALL: 0.85 CM (ref 0.6–1.1)
EOSINOPHIL # BLD AUTO: 0.2 K/UL (ref 0–0.5)
EOSINOPHIL NFR BLD: 2.1 % (ref 0–8)
ERYTHROCYTE [DISTWIDTH] IN BLOOD BY AUTOMATED COUNT: 12.8 % (ref 11.5–14.5)
EST. GFR  (AFRICAN AMERICAN): >60 ML/MIN/1.73 M^2
EST. GFR  (NON AFRICAN AMERICAN): >60 ML/MIN/1.73 M^2
FRACTIONAL SHORTENING: 48 % (ref 28–44)
GLUCOSE SERPL-MCNC: 102 MG/DL (ref 70–110)
HCT VFR BLD AUTO: 34.1 % (ref 40–54)
HGB BLD-MCNC: 11.2 G/DL (ref 14–18)
IMM GRANULOCYTES # BLD AUTO: 0.05 K/UL (ref 0–0.04)
IMM GRANULOCYTES NFR BLD AUTO: 0.5 % (ref 0–0.5)
INTERVENTRICULAR SEPTUM: 0.57 CM (ref 0.6–1.1)
LA MAJOR: 4.08 CM
LA MINOR: 4.03 CM
LA WIDTH: 3.44 CM
LEFT ATRIUM SIZE: 3.45 CM
LEFT ATRIUM VOLUME INDEX: 20 ML/M2
LEFT ATRIUM VOLUME: 40.9 CM3
LEFT INTERNAL DIMENSION IN SYSTOLE: 2.83 CM (ref 2.1–4)
LEFT VENTRICLE DIASTOLIC VOLUME INDEX: 71.27 ML/M2
LEFT VENTRICLE DIASTOLIC VOLUME: 145.54 ML
LEFT VENTRICLE MASS INDEX: 67 G/M2
LEFT VENTRICLE SYSTOLIC VOLUME INDEX: 14.9 ML/M2
LEFT VENTRICLE SYSTOLIC VOLUME: 30.45 ML
LEFT VENTRICULAR INTERNAL DIMENSION IN DIASTOLE: 5.47 CM (ref 3.5–6)
LEFT VENTRICULAR MASS: 136.56 G
LV LATERAL E/E' RATIO: 11.45 M/S
LV SEPTAL E/E' RATIO: 25.2 M/S
LYMPHOCYTES # BLD AUTO: 1.8 K/UL (ref 1–4.8)
LYMPHOCYTES NFR BLD: 18.6 % (ref 18–48)
MAGNESIUM SERPL-MCNC: 2 MG/DL (ref 1.6–2.6)
MCH RBC QN AUTO: 30.9 PG (ref 27–31)
MCHC RBC AUTO-ENTMCNC: 32.8 G/DL (ref 32–36)
MCV RBC AUTO: 94 FL (ref 82–98)
MONOCYTES # BLD AUTO: 0.9 K/UL (ref 0.3–1)
MONOCYTES NFR BLD: 9 % (ref 4–15)
MV PEAK A VEL: 1.62 M/S
MV PEAK E VEL: 1.26 M/S
MV STENOSIS PRESSURE HALF TIME: 114.24 MS
MV VALVE AREA P 1/2 METHOD: 1.93 CM2
NEUTROPHILS # BLD AUTO: 6.5 K/UL (ref 1.8–7.7)
NEUTROPHILS NFR BLD: 69.3 % (ref 38–73)
NRBC BLD-RTO: 0 /100 WBC
PHOSPHATE SERPL-MCNC: 2.9 MG/DL (ref 2.7–4.5)
PLATELET # BLD AUTO: 162 K/UL (ref 150–350)
PMV BLD AUTO: 11.3 FL (ref 9.2–12.9)
POTASSIUM SERPL-SCNC: 4 MMOL/L (ref 3.5–5.1)
PROT SERPL-MCNC: 6.4 G/DL (ref 6–8.4)
RA MAJOR: 3.79 CM
RA PRESSURE: 3 MMHG
RA WIDTH: 2.55 CM
RBC # BLD AUTO: 3.63 M/UL (ref 4.6–6.2)
RIGHT VENTRICULAR END-DIASTOLIC DIMENSION: 2.72 CM
SINUS: 3.2 CM
SODIUM SERPL-SCNC: 138 MMOL/L (ref 136–145)
STJ: 3.43 CM
TDI LATERAL: 0.11 M/S
TDI SEPTAL: 0.05 M/S
TDI: 0.08 M/S
WBC # BLD AUTO: 9.41 K/UL (ref 3.9–12.7)

## 2020-07-28 PROCEDURE — 80053 COMPREHEN METABOLIC PANEL: CPT

## 2020-07-28 PROCEDURE — 97116 GAIT TRAINING THERAPY: CPT

## 2020-07-28 PROCEDURE — 84100 ASSAY OF PHOSPHORUS: CPT

## 2020-07-28 PROCEDURE — 25000003 PHARM REV CODE 250: Performed by: NURSE PRACTITIONER

## 2020-07-28 PROCEDURE — 99233 PR SUBSEQUENT HOSPITAL CARE,LEVL III: ICD-10-PCS | Mod: ,,, | Performed by: SURGERY

## 2020-07-28 PROCEDURE — 83735 ASSAY OF MAGNESIUM: CPT

## 2020-07-28 PROCEDURE — 25000003 PHARM REV CODE 250: Performed by: THORACIC SURGERY (CARDIOTHORACIC VASCULAR SURGERY)

## 2020-07-28 PROCEDURE — 85025 COMPLETE CBC W/AUTO DIFF WBC: CPT

## 2020-07-28 PROCEDURE — 99233 SBSQ HOSP IP/OBS HIGH 50: CPT | Mod: ,,, | Performed by: SURGERY

## 2020-07-28 PROCEDURE — 99900035 HC TECH TIME PER 15 MIN (STAT)

## 2020-07-28 PROCEDURE — 25000003 PHARM REV CODE 250: Performed by: STUDENT IN AN ORGANIZED HEALTH CARE EDUCATION/TRAINING PROGRAM

## 2020-07-28 PROCEDURE — 63600175 PHARM REV CODE 636 W HCPCS: Performed by: STUDENT IN AN ORGANIZED HEALTH CARE EDUCATION/TRAINING PROGRAM

## 2020-07-28 PROCEDURE — 94761 N-INVAS EAR/PLS OXIMETRY MLT: CPT

## 2020-07-28 RX ORDER — FUROSEMIDE 20 MG/1
20 TABLET ORAL 2 TIMES DAILY
Qty: 45 TABLET | Refills: 1 | Status: SHIPPED | OUTPATIENT
Start: 2020-07-28 | End: 2020-08-25 | Stop reason: ALTCHOICE

## 2020-07-28 RX ORDER — OXYCODONE HYDROCHLORIDE 5 MG/1
5 TABLET ORAL EVERY 4 HOURS PRN
Qty: 42 TABLET | Refills: 0 | Status: SHIPPED | OUTPATIENT
Start: 2020-07-28 | End: 2020-08-25 | Stop reason: ALTCHOICE

## 2020-07-28 RX ORDER — METOPROLOL TARTRATE 25 MG/1
25 TABLET, FILM COATED ORAL 2 TIMES DAILY
Qty: 60 TABLET | Refills: 11 | Status: SHIPPED | OUTPATIENT
Start: 2020-07-28 | End: 2020-08-25 | Stop reason: ALTCHOICE

## 2020-07-28 RX ORDER — POTASSIUM CHLORIDE 20 MEQ/1
20 TABLET, EXTENDED RELEASE ORAL 2 TIMES DAILY
Qty: 45 TABLET | Refills: 1 | Status: SHIPPED | OUTPATIENT
Start: 2020-07-28 | End: 2020-08-25 | Stop reason: ALTCHOICE

## 2020-07-28 RX ORDER — POTASSIUM CHLORIDE 20 MEQ/1
20 TABLET, EXTENDED RELEASE ORAL ONCE
Qty: 1 TABLET | Refills: 0 | Status: SHIPPED | OUTPATIENT
Start: 2020-07-28 | End: 2020-07-28 | Stop reason: SDUPTHER

## 2020-07-28 RX ORDER — DOCUSATE SODIUM 100 MG/1
100 CAPSULE, LIQUID FILLED ORAL 2 TIMES DAILY PRN
Qty: 14 CAPSULE | Refills: 0 | Status: SHIPPED | OUTPATIENT
Start: 2020-07-28 | End: 2020-08-04

## 2020-07-28 RX ORDER — PANTOPRAZOLE SODIUM 40 MG/1
40 TABLET, DELAYED RELEASE ORAL DAILY
Qty: 30 TABLET | Refills: 11 | Status: SHIPPED | OUTPATIENT
Start: 2020-07-29 | End: 2020-08-25 | Stop reason: ALTCHOICE

## 2020-07-28 RX ORDER — ASPIRIN 325 MG
325 TABLET ORAL DAILY
Qty: 30 TABLET | Refills: 11 | Status: SHIPPED | OUTPATIENT
Start: 2020-07-29 | End: 2020-08-26

## 2020-07-28 RX ADMIN — METOPROLOL TARTRATE 25 MG: 25 TABLET, FILM COATED ORAL at 09:07

## 2020-07-28 RX ADMIN — FUROSEMIDE 40 MG: 10 INJECTION, SOLUTION INTRAVENOUS at 09:07

## 2020-07-28 RX ADMIN — PANTOPRAZOLE SODIUM 40 MG: 40 TABLET, DELAYED RELEASE ORAL at 09:07

## 2020-07-28 RX ADMIN — ASPIRIN 325 MG ORAL TABLET 325 MG: 325 PILL ORAL at 09:07

## 2020-07-28 RX ADMIN — MAGNESIUM OXIDE 400 MG (241.3 MG MAGNESIUM) TABLET 400 MG: TABLET at 09:07

## 2020-07-28 RX ADMIN — POTASSIUM CHLORIDE 20 MEQ: 1500 TABLET, EXTENDED RELEASE ORAL at 09:07

## 2020-07-28 RX ADMIN — OXYCODONE 5 MG: 5 TABLET ORAL at 09:07

## 2020-07-28 RX ADMIN — DOCUSATE SODIUM 100 MG: 100 CAPSULE, LIQUID FILLED ORAL at 09:07

## 2020-07-28 NOTE — ASSESSMENT & PLAN NOTE
S/P mitral valve repair  S/p MVR 7/24/20.      Neuro:   - Oxy 5/10 PRN for pain, dilaudid PRN for IV breakthrough   - Following commands, alert and conversant      Pulmonary:   - Extubated 7/24/20  - Room air  - PRN breathing treatments      Cardiac:  - MAP goal 60-80  - HDS off pressors  - Has been off cardene since early am 7/25  - ASA daily     Renal:   - UOP 3775cc / 24 hrs   - FB -2505cc / 24 hrs   - BUN 19 (16)/ Cr 1.0 (1.1)        Fluids/Electrolytes/Nutrition/GI:   -Nutritional status:enteral feeding  - Cardiac diet as tolerated with fluid restriction  -replace lytes PRN  -Bowel regimen     Hematology/Oncology:  -H/H stable, no need for any transfusions at this time   - Plt 162  -Anticoagulation: ASA  - Statin      Infectious Disease:   -Afebrile  -WBC 9.4 (11.8)  -Abx: post op course of ancef     Endocrine:  -Glucose goal <180  - Accuchecks      Dispo:  - Discharge to home     Primary: CTS

## 2020-07-28 NOTE — SUBJECTIVE & OBJECTIVE
Interval History/Significant Events: NAEO, VSS. UOP 3775cc/24hrs, CT removed yesterday. Pain is well controlled.  Ambulating the halls and making laps around the unit.    Follow-up For: Procedure(s) (LRB):  VALVULOPLASTY, MITRAL VALVE (N/A)    Post-Operative Day: 2 Days Post-Op    Objective:     Vital Signs (Most Recent):  Temp: 98.5 °F (36.9 °C) (07/28/20 0700)  Pulse: 96 (07/28/20 0723)  Resp: 13 (07/28/20 0723)  BP: 116/69 (07/28/20 0700)  SpO2: 95 % (07/28/20 0723) Vital Signs (24h Range):  Temp:  [98.5 °F (36.9 °C)-99.2 °F (37.3 °C)] 98.5 °F (36.9 °C)  Pulse:  [82-99] 96  Resp:  [13-32] 13  SpO2:  [95 %-100 %] 95 %  BP: (103-121)/(60-75) 116/69     Weight: 95.6 kg (210 lb 12.2 oz)  Body mass index is 34.02 kg/m².      Intake/Output Summary (Last 24 hours) at 7/28/2020 0912  Last data filed at 7/28/2020 0700  Gross per 24 hour   Intake 1790 ml   Output 3815 ml   Net -2025 ml       Physical Exam  Vitals signs and nursing note reviewed.   Constitutional:       General: He is not in acute distress.  Cardiovascular:      Rate and Rhythm: Normal rate.      Pulses: Normal pulses.      Comments: Sternotomy c/d/i with overlying dressing  Pulmonary:      Effort: Pulmonary effort is normal. No respiratory distress.   Abdominal:      General: There is no distension.      Palpations: Abdomen is soft.   Skin:     General: Skin is warm and dry.   Neurological:      General: No focal deficit present.      Mental Status: He is alert and oriented to person, place, and time.         Vents:  Oxygen Concentration (%): 24 (07/27/20 0345)    Lines/Drains/Airways     Peripheral Intravenous Line                 Peripheral IV - Single Lumen 07/26/20 1100 20 G Right Wrist 1 day         Peripheral IV - Single Lumen 07/27/20 0723 20 G Right Antecubital 1 day                Significant Labs:    CBC/Anemia Profile:  Recent Labs   Lab 07/27/20  0359 07/28/20  0302   WBC 11.77 9.41   HGB 10.0* 11.2*   HCT 30.9* 34.1*   * 162   MCV 95 94    RDW 13.1 12.8        Chemistries:  Recent Labs   Lab 07/27/20  0359 07/27/20  1724 07/28/20  0302     --  138   K 4.4 3.9 4.0  4.0  4.0     --  102   CO2 30*  --  27   BUN 16  --  19   CREATININE 1.1  --  1.0   CALCIUM 8.2*  --  9.3   ALBUMIN  --   --  3.3*   PROT  --   --  6.4   BILITOT  --   --  0.8   ALKPHOS  --   --  78   ALT  --   --  27   AST  --   --  39   MG 2.1  --  2.0   PHOS 2.3*  --  2.9         Significant Imaging:  I have reviewed all pertinent imaging results/findings within the past 24 hours.

## 2020-07-28 NOTE — PLAN OF CARE
SICU PLAN OF CARE NOTE    Dx: S/P mitral valve repair    Shift Events: VSS throughout shift. No acute events occurred. Pt OOBTC.     Goals of Care: Will continue to monitor and maintain MAPs 60-80.     Neuro: AAO x4, Arouses to Voice, Follows Commands, and Moves All Extremities    Vital Signs: /75 (BP Location: Right arm, Patient Position: Lying)   Pulse 88   Temp 98.6 °F (37 °C) (Oral)   Resp 19   Wt 95.6 kg (210 lb 12.2 oz)   SpO2 95%   BMI 34.02 kg/m²     Respiratory: Room Air    Diet: Cardiac Diet with 1500 ml fluid restriction     Gtts: None    Urine Output: Voids Spontaneously 1875 cc/shift    Drains: None     Labs/Accuchecks: Labs monitored and replaced as needed.    Skin: See flowsheets. Pt repositioned frequently.  Heel and sacral foams in place.  Bed plugged in and working properly.  Tubing kept free from skin.  Waffle mattress inflated. Green waffle pad in place when OOBTC. Midline incision clean and dry with steri strip intact.    \

## 2020-07-28 NOTE — PLAN OF CARE
Patient accepted by [facility]. Patient notified at bedside of this.     Patient's wife will provide transportation upon discharge.        07/28/20 1249   Post-Acute Status   Post-Acute Authorization Home Health   Home Health Status Set-up Complete   Discharge Plan   Discharge Plan A Home Health     Layne Martinez LMSW   - Case Management

## 2020-07-28 NOTE — PLAN OF CARE
Plan of Care Note  Cardiothoracic Surgery    Kamari Merino is a 55 y.o. male with MR s/p mitral valve repair (7/24).    Specific issues:     --D/c chest tubes  --continue lasix 40 bid, asa 325, metop 25  --increase metop to 25  --to stepdown, pending bed availability  --encourage ambulation and IS    Plan of care for patient was discussed with ICU staff including nurses, residents, and faculty and appropriate consulting services.    Will continue to monitor patient's hemodynamics, functionality, neuro status, fluid status and renal function, and labs and will adjust medications and fluids as necessary while monitoring appropriateness for de-escalation of support and monitoring and transport to stepdown unit.    Shorty Perez MD  Cardiothoracic Surgery Fellow

## 2020-07-28 NOTE — PLAN OF CARE
SW met with patient and spouse at bedside to review discharge planning of . Patient is agreeable to this plan.    Presented patient at least five facility options within their insurance network for post-acute placement. Educated patient  that referrals were sent to:  [facility]    Discharge Planner will follow up with patient regarding outcome of referrals. Educated patient on the need for early discharge planning to reduce the possibility of financial responsibility once patient is medically stable for discharge.     Patient choice form was explained and signed by patient and placed in patient chart to be uploaded in medical record.        07/28/20 1205   Post-Acute Status   Post-Acute Authorization Home Health   Home Health Status Referrals Sent   Discharge Plan   Discharge Plan A Home Health     Layne Martinez LMSW   - Case Management

## 2020-07-28 NOTE — HOSPITAL COURSE
On 7/24/2020 the patient was taken to the Operating Room for the above stated procedure. Please see the previously dictated operative report for complete details. Postoperatively, the patient was taken from the  Operating Room to the ICU where the vital signs were monitored and pain was kept under control. The patient was weaned from the drips and extubated in the ICU per protocol. Once hemodynamically stable, the patient was transferred to the Cardiac Step-Down floor for continued strengthening and ambulation. On postoperative day 4, the patient was ready for discharge to home. At the time of discharge, the patient was ambulating unassisted. Pain was well controlled with oral analgesics and the patient was tolerating the diet.     MOBILITY AND ACTIVITY: As tolerated. Patient may shower. No heavy lifting of greater than 5 pounds and no driving.     DIET: An 1800-calorie ADA with a 1500 mL fluid restriction.     WOUND CARE INSTRUCTIONS: Check for redness, swelling and drainage around the  incision or wound. Patient is to call for any obvious bleeding, drainage, pus from the wound, unusual problems or difficulties or temperature of greater than 101   degrees.     FOLLOWUP: Follow up with  in approximately 3 weeks. Prior to this  appointment, the patient will have a chest x-ray and EKG.     Patient not placed on Ace-Inhibitor at the time of discharge due to potential for hypotension      DISCHARGE CONDITION: At the time of discharge, the patient was in sinus rhythm and afebrile with stable vital signs.

## 2020-07-28 NOTE — PT/OT/SLP DISCHARGE
Occupational Therapy Discharge Summary    Kamari Merino  MRN: [id number]   Principal Problem: S/P mitral valve repair      Patient Discharged from acute Occupational Therapy on 7/28/20.      Assessment:      Patient was discharged unexpectedly.  Information required to complete an accurate discharge summary is unknown.  Refer to therapy initial evaluation and last progress note for initial and most recent functional status and goal achievement.  Recommendations made may be found in medical record.    Objective:     GOALS:   Multidisciplinary Problems     Occupational Therapy Goals        Problem: Occupational Therapy Goal    Goal Priority Disciplines Outcome Interventions   Occupational Therapy Goal     OT, PT/OT Ongoing, Progressing    Description: Goals to be met by: 8/5/20    Patient will increase functional independence with ADLs by performing:    UE Dressing with [place].  LE Dressing with [place].  Grooming while standing at sink with Modified [place]. Met 7/27 ETELVINA  Toileting from toilet with Modified [place] for hygiene and clothing management.   Step transfer with Modified [place] with no AD to prepare for household mobility to complete occupations of choice  Toilet transfer to toilet with Modified [place].  Pt will adhere to 3/3 sternal precautions for 3 consecutive sessions without any verbal cues.                      Reasons for Discontinuation of Therapy Services  Transfer to alternate level of care.      Plan:     Patient Discharged to: Home with Home Health Service    BARRERA Coronado  7/28/2020

## 2020-07-28 NOTE — PLAN OF CARE
Problem: Physical Therapy Goal  Goal: Physical Therapy Goal  Description: Goals to be met by: 20     Patient will increase functional independence with mobility by performin. Supine to sit with Stand-by Assistance and observing sternal precautions- met  2. Sit to supine with Stand-by Assistance and observing sternal precautions- met  3. Sit to stand transfer with Stand-by Assistance and observing sternal precautions- met  4. Bed to chair transfer with Stand-by Assistance  and observing sternal precautions- met   5. Gait  x 150 feet with Stand-by Assistance  and observing sternal precautions. - met   6. Ascend/Descend 6 inch curb step with Contact Guard Assistance/ hand held assistance, and observing sternal precautions.- not observed  7. Lower extremity exercise program x15 reps per handout, with assistance as needed- met     Outcome: Met    Aneta Sunshine, PT, DPT  2020

## 2020-07-28 NOTE — PT/OT/SLP PROGRESS
Physical Therapy Treatment/Discharge    Patient Name:  Kamari Merino   MRN:  [id number]    Recommendations:     Discharge Recommendations:  home health PT   Discharge Equipment Recommendations: none   Barriers to discharge: None    Assessment:     Kamari Merino is a 55 y.o. male admitted with a medical diagnosis of S/P mitral valve repair.  He presents with the following impairments/functional limitations:  impaired endurance. Pt tolerated treatment well today. Pt ambulated 280 ft independently with no AD and no LOB. Pt has met all PT goals and PT is recommending discharge to home with  PT.     Rehab Prognosis: Good; Recent Surgery: Procedure(s) (LRB):  VALVULOPLASTY, MITRAL VALVE (N/A) 4 Days Post-Op    Plan:   Discharge from acute PT services.     Subjective     Chief Complaint: none  Patient/Family Comments/goals: To go home and get stronger. Pt reports that he feels ready for d/c and has no questions or concerns at this time.   Pain/Comfort:  · Pain Rating Post-Intervention 1: 0/10      Objective:     Communicated with RN prior to session.  Patient found up in chair with telemetry, pulse ox (continuous) upon PT entry to room.     General Precautions: Standard, sternal   Orthopedic Precautions:N/A   Braces: N/A     Functional Mobility:  · Transfers:     · Sit to Stand:  independence with no AD  · Gait: 280 ft with no AD. Decreased gait speed, decreased step length. No LOB or other abnormalities of gait. Pt placed on portable monitor for out of room ambulation, vitals remained WNL.   · Balance: Good; no LOB       AM-PAC 6 CLICK MOBILITY  Turning over in bed (including adjusting bedclothes, sheets and blankets)?: 4  Sitting down on and standing up from a chair with arms (e.g., wheelchair, bedside commode, etc.): 4  Moving from lying on back to sitting on the side of the bed?: 4  Moving to and from a bed to a chair (including a wheelchair)?: 4  Need to walk in hospital room?: 4  Climbing 3-5 steps with a railing?:  4  Basic Mobility Total Score: 24       Therapeutic Activities and Exercises:   Pt asked PT for exercises to do at home. PT and pt discussed the following: ankle pumps, LAQ, marching x 30 reps each or to tolerance. Pt reported understanding.     Treatment & Education:  · Therapist provided facilitation and instruction of proper body mechanics, energy conservation, and fall prevention strategies during tasks listed above.  · Encouraged patient to sit up in bedside chair daily to increase OOB/activity tolerance.  · Instructed patient to call nursing staff  for assist with transfers.   · Educated patient on PT POC and answered all questions within PT scope of practice.   · Pt and family educated to have care team re-consult therapy services if needs change.       Patient left up in chair with all lines intact, call button in reach and wife present.    GOALS:   Multidisciplinary Problems     Physical Therapy Goals     Not on file          Multidisciplinary Problems (Resolved)        Problem: Physical Therapy Goal    Goal Priority Disciplines Outcome Goal Variances Interventions   Physical Therapy Goal   (Resolved)     PT, PT/OT Met     Description: Goals to be met by: 20     Patient will increase functional independence with mobility by performin. Supine to sit with Stand-by Assistance and observing sternal precautions- met  2. Sit to supine with Stand-by Assistance and observing sternal precautions- met  3. Sit to stand transfer with Stand-by Assistance and observing sternal precautions- met  4. Bed to chair transfer with Stand-by Assistance  and observing sternal precautions- met   5. Gait  x 150 feet with Stand-by Assistance  and observing sternal precautions. - met   6. Ascend/Descend 6 inch curb step with Contact Guard Assistance/ hand held assistance, and observing sternal precautions.- not observed  7. Lower extremity exercise program x15 reps per handout, with assistance as needed- met                       Time Tracking:     PT Received On: 07/28/20  PT Start Time: 1301     PT Stop Time: 1314  PT Total Time (min): 13 min     Billable Minutes: Gait Training 13    Treatment Type: Treatment  PT/PTA: PT     PTA Visit Number: 0     JULIET WHELAN, PT  07/28/2020

## 2020-07-28 NOTE — DISCHARGE SUMMARY
[facility]  Cardiothoracic Surgery  Discharge Summary      Patient Name: Kamari Merino  MRN: [id number]  Admission Date: 7/24/2020  Hospital Length of Stay: 4 days  Discharge Date and Time:  07/28/2020 11:10 AM  Attending Physician: Cornel Guzman MD   Discharging Provider: Roxy Ramirez NP  Primary Care Provider: Primary Doctor No    HPI:   Mr. Merino is a 55 year old gentleman who initially had trouble regulating his blood pressure.   His primary care physician referred him to a cardiologist to did an echo.  This study demonstrated severe mitral regurgitation.   He reported fatigue and decreased stamina.  Given the findings on echo, and his symptoms, he now presents for surgical correction through [facility].    Procedure(s) (LRB):  VALVULOPLASTY, MITRAL VALVE (N/A)      Indwelling Lines/Drains at time of discharge:   Lines/Drains/Airways     None               Hospital Course: On 7/24/2020 the patient was taken to the Operating Room for the above stated procedure. Please see the previously dictated operative report for complete details. Postoperatively, the patient was taken from the  Operating Room to the ICU where the vital signs were monitored and pain was kept under control. The patient was weaned from the drips and extubated in the ICU per protocol. Once hemodynamically stable, the patient was transferred to the Cardiac Step-Down floor for continued strengthening and ambulation. On postoperative day 4, the patient was ready for discharge to home. At the time of discharge, the patient was ambulating unassisted. Pain was well controlled with oral analgesics and the patient was tolerating the diet.     MOBILITY AND ACTIVITY: As tolerated. Patient may shower. No heavy lifting of greater than 5 pounds and no driving.     DIET: An 1800-calorie ADA with a 1500 mL fluid restriction.     WOUND CARE INSTRUCTIONS: Check for redness, swelling and drainage around the  incision or wound. Patient is to call  for any obvious bleeding, drainage, pus from the wound, unusual problems or difficulties or temperature of greater than 101   degrees.     FOLLOWUP: Follow up with  in approximately 3 weeks. Prior to this  appointment, the patient will have a chest x-ray and EKG.     Patient not placed on Ace-Inhibitor at the time of discharge due to potential for hypotension      DISCHARGE CONDITION: At the time of discharge, the patient was in sinus rhythm and afebrile with stable vital signs.    Consults (From admission, onward)        Status Ordering Provider     Consult Case Management/Social Work  Once     Provider:  (Not yet assigned)    Acknowledged ERIC CHEW     Consult to Endocrinology  Once     Provider:  (Not yet assigned)    Completed ERIC CHEW           Pending Diagnostic Studies:     Procedure Component Value Units Date/Time    Echo Color Flow Doppler? Yes [[id number]]     Order Status: Sent Lab Status: No result     Specimen to Pathology, Surgery Cardiovascular [[id number]] Collected: 07/24/20 1558    Order Status: Sent Lab Status: In process Updated: 07/25/20 6724          No new Assessment & Plan notes have been filed under this hospital service since the last note was generated.  Service: Cardiothoracic Surgery    Final Active Diagnoses:    Diagnosis Date Noted POA    PRINCIPAL PROBLEM:  S/P mitral valve repair [Z98.890] 07/24/2020 Not Applicable    Mitral regurgitation [I34.0] 07/25/2020 Yes    Acute hyperglycemia [R73.9] 07/24/2020 No    Obesity (BMI 30-39.9) [E66.9] 07/24/2020 No      Problems Resolved During this Admission:    Diagnosis Date Noted Date Resolved POA    Mitral regurgitation [I34.0] 06/23/2020 07/24/2020 Yes      Discharged Condition: stable    Disposition: Home or Self Care    Follow Up:    Patient Instructions:   No discharge procedures on file.  Medications:  Reconciled Home Medications:      Medication List      START taking these medications    aspirin 325 MG  tablet  Take 1 tablet (325 mg total) by mouth once daily.  Start taking on: July 29, 2020     docusate sodium 100 MG capsule  Commonly known as: COLACE  Take 1 capsule (100 mg total) by mouth 2 (two) times daily as needed for Constipation.     furosemide 20 MG tablet  Commonly known as: LASIX  Take 1 tablet (20 mg total) by mouth 2 (two) times daily.     oxyCODONE 5 MG immediate release tablet  Commonly known as: ROXICODONE  Take 1 tablet (5 mg total) by mouth every 4 (four) hours as needed.     pantoprazole 40 MG tablet  Commonly known as: PROTONIX  Take 1 tablet (40 mg total) by mouth once daily.  Start taking on: July 29, 2020     potassium chloride SA 20 MEQ tablet  Commonly known as: K-DUR,KLOR-CON  Take 1 tablet (20 mEq total) by mouth once. for 1 dose        CHANGE how you take these medications    metoprolol tartrate 25 MG tablet  Commonly known as: LOPRESSOR  Take 1 tablet (25 mg total) by mouth 2 (two) times daily.  What changed: how to take this        CONTINUE taking these medications    MULTIVITAMIN 50 PLUS ORAL  See Instructions, Take 1 po Daily, 0 Refill(s)     OMEGA-3-DHA-EPA-DPA-FISH OIL ORAL  1 cap, Oral, Daily, # 100 cap, 0 Refill(s)        STOP taking these medications    aspirin-calcium carbonate 81 mg-300 mg calcium(777 mg) Tab     losartan 100 MG tablet  Commonly known as: COZAAR          Time spent on the discharge of patient: 35 minutes    Roxy Ramirez NP  Cardiothoracic Surgery  [facility]

## 2020-07-28 NOTE — HPI
Mr. Merino is a 55 year old gentleman who initially had trouble regulating his blood pressure.   His primary care physician referred him to a cardiologist to did an echo.  This study demonstrated severe mitral regurgitation.   He reported fatigue and decreased stamina.  Given the findings on echo, and his symptoms, he now presents for surgical correction through [facility].

## 2020-07-28 NOTE — PROGRESS NOTES
[facility]  Critical Care - Surgery  Progress Note    Patient Name: Kamari Merino  MRN: [id number]  Admission Date: 7/24/2020  Hospital Length of Stay: 4 days  Code Status: Full Code  Attending Provider: Cornel Guzman MD  Primary Care Provider: Primary Doctor No   Principal Problem: S/P mitral valve repair    Subjective:     Hospital/ICU Course:  No notes on file    Interval History/Significant Events: NAEO, VSS. UOP 3775cc/24hrs, CT removed yesterday. Pain is well controlled.  Ambulating the halls and making laps around the unit.    Follow-up For: Procedure(s) (LRB):  VALVULOPLASTY, MITRAL VALVE (N/A)    Post-Operative Day: 2 Days Post-Op    Objective:     Vital Signs (Most Recent):  Temp: 98.5 °F (36.9 °C) (07/28/20 0700)  Pulse: 96 (07/28/20 0723)  Resp: 13 (07/28/20 0723)  BP: 116/69 (07/28/20 0700)  SpO2: 95 % (07/28/20 0723) Vital Signs (24h Range):  Temp:  [98.5 °F (36.9 °C)-99.2 °F (37.3 °C)] 98.5 °F (36.9 °C)  Pulse:  [82-99] 96  Resp:  [13-32] 13  SpO2:  [95 %-100 %] 95 %  BP: (103-121)/(60-75) 116/69     Weight: 95.6 kg (210 lb 12.2 oz)  Body mass index is 34.02 kg/m².      Intake/Output Summary (Last 24 hours) at 7/28/2020 0912  Last data filed at 7/28/2020 0700  Gross per 24 hour   Intake 1790 ml   Output 3815 ml   Net -2025 ml       Physical Exam  Vitals signs and nursing note reviewed.   Constitutional:       General: He is not in acute distress.  Cardiovascular:      Rate and Rhythm: Normal rate.      Pulses: Normal pulses.      Comments: Sternotomy c/d/i with overlying dressing  Pulmonary:      Effort: Pulmonary effort is normal. No respiratory distress.   Abdominal:      General: There is no distension.      Palpations: Abdomen is soft.   Skin:     General: Skin is warm and dry.   Neurological:      General: No focal deficit present.      Mental Status: He is alert and oriented to person, place, and time.         Vents:  Oxygen Concentration (%): 24 (07/27/20  7015)    Lines/Drains/Airways     Peripheral Intravenous Line                 Peripheral IV - Single Lumen 07/26/20 1100 20 G Right Wrist 1 day         Peripheral IV - Single Lumen 07/27/20 0723 20 G Right Antecubital 1 day                Significant Labs:    CBC/Anemia Profile:  Recent Labs   Lab 07/27/20  0359 07/28/20  0302   WBC 11.77 9.41   HGB 10.0* 11.2*   HCT 30.9* 34.1*   * 162   MCV 95 94   RDW 13.1 12.8        Chemistries:  Recent Labs   Lab 07/27/20  0359 07/27/20  1724 07/28/20  0302     --  138   K 4.4 3.9 4.0  4.0  4.0     --  102   CO2 30*  --  27   BUN 16  --  19   CREATININE 1.1  --  1.0   CALCIUM 8.2*  --  9.3   ALBUMIN  --   --  3.3*   PROT  --   --  6.4   BILITOT  --   --  0.8   ALKPHOS  --   --  78   ALT  --   --  27   AST  --   --  39   MG 2.1  --  2.0   PHOS 2.3*  --  2.9         Significant Imaging:  I have reviewed all pertinent imaging results/findings within the past 24 hours.    Assessment/Plan:     * S/P mitral valve repair  S/P mitral valve repair  S/p MVR 7/24/20.      Neuro:   - Oxy 5/10 PRN for pain, dilaudid PRN for IV breakthrough   - Following commands, alert and conversant      Pulmonary:   - Extubated 7/24/20  - Room air  - PRN breathing treatments      Cardiac:  - MAP goal 60-80  - HDS off pressors  - Has been off cardene since early am 7/25  - ASA daily     Renal:   - UOP 3775cc / 24 hrs   - FB -2505cc / 24 hrs   - BUN 19 (16)/ Cr 1.0 (1.1)        Fluids/Electrolytes/Nutrition/GI:   -Nutritional status:enteral feeding  - Cardiac diet as tolerated with fluid restriction  -replace lytes PRN  -Bowel regimen     Hematology/Oncology:  -H/H stable, no need for any transfusions at this time   - Plt 162  -Anticoagulation: ASA  - Statin      Infectious Disease:   -Afebrile  -WBC 9.4 (11.8)  -Abx: post op course of ancef     Endocrine:  -Glucose goal <180  - Accuchecks      Dispo:  - Discharge to home     Primary: TANK       Critical care was time spent personally  by me on the following activities: development of treatment plan with patient or surrogate and bedside caregivers, discussions with consultants, evaluation of patient's response to treatment, examination of patient, ordering and performing treatments and interventions, ordering and review of laboratory studies, ordering and review of radiographic studies, pulse oximetry, re-evaluation of patient's condition.  This critical care time did not overlap with that of any other provider or involve time for any procedures.     Sonny Olivier MD  Critical Care - Surgery  [facility]

## 2020-07-29 DIAGNOSIS — Z98.890 S/P MITRAL VALVE REPAIR: Primary | ICD-10-CM

## 2020-07-30 LAB — FINAL PATHOLOGIC DIAGNOSIS: NORMAL

## 2020-07-30 PROCEDURE — G0180 PR HOME HEALTH MD CERTIFICATION: ICD-10-PCS | Mod: ,,, | Performed by: THORACIC SURGERY (CARDIOTHORACIC VASCULAR SURGERY)

## 2020-07-30 PROCEDURE — G0180 MD CERTIFICATION HHA PATIENT: HCPCS | Mod: ,,, | Performed by: THORACIC SURGERY (CARDIOTHORACIC VASCULAR SURGERY)

## 2020-08-05 NOTE — TELEPHONE ENCOUNTER
Per Dr. Thomas RATLIFF to stop Lasix and K+     Denies SOB, wet cough and leg swelling....    Spoke with Mrs. Wilber Goddard..

## 2020-08-24 ENCOUNTER — EXTERNAL HOME HEALTH (OUTPATIENT)
Dept: HOME HEALTH SERVICES | Facility: HOSPITAL | Age: 55
End: 2020-08-24
Payer: COMMERCIAL

## 2020-08-25 ENCOUNTER — HOSPITAL ENCOUNTER (OUTPATIENT)
Dept: RADIOLOGY | Facility: HOSPITAL | Age: 55
Discharge: HOME | End: 2020-08-25
Attending: THORACIC SURGERY (CARDIOTHORACIC VASCULAR SURGERY)
Payer: COMMERCIAL

## 2020-08-25 ENCOUNTER — OFFICE VISIT (OUTPATIENT)
Dept: CARDIOTHORACIC SURGERY | Facility: CLINIC | Age: 55
End: 2020-08-25
Payer: COMMERCIAL

## 2020-08-25 ENCOUNTER — HOSPITAL ENCOUNTER (OUTPATIENT)
Dept: CARDIOLOGY | Facility: CLINIC | Age: 55
Discharge: HOME | End: 2020-08-25
Payer: COMMERCIAL

## 2020-08-25 VITALS
HEIGHT: 66 IN | HEART RATE: 100 BPM | BODY MASS INDEX: 31.36 KG/M2 | SYSTOLIC BLOOD PRESSURE: 131 MMHG | WEIGHT: 195.13 LBS | OXYGEN SATURATION: 97 % | TEMPERATURE: 99 F | DIASTOLIC BLOOD PRESSURE: 87 MMHG

## 2020-08-25 DIAGNOSIS — Z98.890 S/P MITRAL VALVE REPAIR: ICD-10-CM

## 2020-08-25 DIAGNOSIS — Z98.890 S/P MITRAL VALVE REPAIR: Primary | ICD-10-CM

## 2020-08-25 PROCEDURE — 99999 PR PBB SHADOW E&M-EST. PATIENT-LVL IV: CPT | Mod: PBBFAC,,, | Performed by: THORACIC SURGERY (CARDIOTHORACIC VASCULAR SURGERY)

## 2020-08-25 PROCEDURE — 99999 PR PBB SHADOW E&M-EST. PATIENT-LVL IV: ICD-10-PCS | Mod: PBBFAC,,, | Performed by: THORACIC SURGERY (CARDIOTHORACIC VASCULAR SURGERY)

## 2020-08-25 PROCEDURE — 99024 POSTOP FOLLOW-UP VISIT: CPT | Mod: S$GLB,,, | Performed by: THORACIC SURGERY (CARDIOTHORACIC VASCULAR SURGERY)

## 2020-08-25 PROCEDURE — 99024 PR POST-OP FOLLOW-UP VISIT: ICD-10-PCS | Mod: S$GLB,,, | Performed by: THORACIC SURGERY (CARDIOTHORACIC VASCULAR SURGERY)

## 2020-08-25 PROCEDURE — 71046 X-RAY EXAM CHEST 2 VIEWS: CPT | Mod: 26,,, | Performed by: RADIOLOGY

## 2020-08-25 PROCEDURE — 71046 X-RAY EXAM CHEST 2 VIEWS: CPT | Mod: TC,FY

## 2020-08-25 PROCEDURE — 93010 ELECTROCARDIOGRAM REPORT: CPT | Mod: S$GLB,,, | Performed by: INTERNAL MEDICINE

## 2020-08-25 PROCEDURE — 93005 ELECTROCARDIOGRAM TRACING: CPT | Mod: S$GLB,,, | Performed by: THORACIC SURGERY (CARDIOTHORACIC VASCULAR SURGERY)

## 2020-08-25 PROCEDURE — 71046 XR CHEST PA AND LATERAL: ICD-10-PCS | Mod: 26,,, | Performed by: RADIOLOGY

## 2020-08-25 PROCEDURE — 93010 EKG 12-LEAD: ICD-10-PCS | Mod: S$GLB,,, | Performed by: INTERNAL MEDICINE

## 2020-08-25 PROCEDURE — 93005 EKG 12-LEAD: ICD-10-PCS | Mod: S$GLB,,, | Performed by: THORACIC SURGERY (CARDIOTHORACIC VASCULAR SURGERY)

## 2020-08-25 RX ORDER — METOPROLOL TARTRATE 25 MG/1
TABLET ORAL
COMMUNITY
Start: 2020-07-31 | End: 2020-08-25 | Stop reason: ALTCHOICE

## 2020-08-25 RX ORDER — METOPROLOL TARTRATE 25 MG/1
25 TABLET, FILM COATED ORAL 2 TIMES DAILY
Qty: 60 TABLET | Refills: 11 | Status: SHIPPED | OUTPATIENT
Start: 2020-08-25 | End: 2021-08-25

## 2020-08-25 RX ORDER — MELOXICAM 15 MG/1
TABLET ORAL
COMMUNITY
Start: 2018-02-25

## 2020-08-25 NOTE — PATIENT INSTRUCTIONS
[facility] will contact you to enroll you for Cardiac Rehab...    Change Metoprolol to 25 mg twice a day....

## 2020-08-25 NOTE — PROGRESS NOTES
Patient seen and examined. Patient is progressively increasing activity. No significant complaints.     Sternum: stable, incision CDI  Chest xray: Acceptable post op chest  EKG: NSR  ECHO 7/28/2020:  · Normal left ventricular systolic function. The estimated ejection fraction is 65%.  · S/p mechanical valve repair with 33 mm Medtronic simulus annuloplasty band and Jenni stitch. Mean diastolic pressure gradient across MV is 5.9 mmHg at HR of 98 bpm. Trace MR seen.  · Indeterminate left ventricular diastolic function.  · Septal wall has abnormal motion consistent with post-operative status.  · Normal central venous pressure (3 mmHg).  · IVC appears small suggestive of hypovolumia   ·   Assessment:  Mitral valve repair with quadrangular resection of the P2 scallop of the posterior leaflet of the mitral valve, sliding plasty, central Jenni stitch, and 33 mm Medtronic Simulus band annuloplasty 7/24/2020.     Plan:  Can begin driving   Can begin cardiac rehab 6 weeks after operation   We will refer to cardiology to assume care   DC lasix, potassium  Increase Metoprolol to 25 mg twice a day  No work is recommended for 12 weeks from surgery date, 7/24/2020-10/16/2020.  Expected to return to work on 10/19/2020.      No scheduled appointment, RTC prn    CTS Attending Note:    I have personally taken the history and examined this patient and agree with the FAISAL's note as stated above.

## 2020-08-25 NOTE — LETTER
[facility] - Cardiovasc Surg 2nd Fl  [address]  [facility] [address]  Phone: [phone] August 25, 2020      Leonidas Osuna MD  [address] [facility] [place] [address]    Patient: Kamari Merino   MR Number: [id number]   YOB: 1965   Date of Visit: 8/25/2020     Dear Dr. Osuna,     I had the pleasure seeing your patient Mr. Kamari Merino in clinic today.  As you know, he is a very pleasant 55-year-old gentleman who initially was having trouble controlling his blood pressure.  This led to a thoughtful and thorough evaluation which demonstrated severe mitral regurgitation.  He was symptomatic for this.  On July 24th, he underwent mitral valve repair.  His postoperative course was unremarkable, and he was ultimately discharged home.  Today he returned for routine follow-up, and in clinic today Mr. Merino looks fabulous.  On physical examination, his sternum appeared to be healing nicely.  His chest x-ray was clear.  His EKG demonstrated a normal sinus rhythm.  Overall, I am very pleased with how well Mr. Merino has done.  As a result, I did not schedule him to follow up with me.  Certainly, should you or he have any questions or concerns please do not hesitate to give me a call.  Of note, I have included copies of both his operative report and his pre-discharge echo for your records.  If you need anything else, please do not hesitate to let us know.  Thank you again for sending this pleasant gentleman to me.    Sincerely,        Cornel Guzman MD  Chief, Division of Thoracic & Cardiovascular Surgery  [facility]    PEP/afw                DATE OF PROCEDURE:  07/24/2020     ATTENDING SURGEON:  Cornel Guzman M.D.     ASSISTANT: Elsi Kapoor MD, (Cardiothoracic Resident)     PREOPERATIVE DIAGNOSES:  1.  Severe mitral regurgitation.  2.  Hypertension     POSTOPERATIVE DIAGNOSES:  1.  Severe mitral regurgitation.  2.  Hypertension     OPERATION PERFORMED:  Mitral  valve repair with quadrangular resection of the P2   scallop of the posterior leaflet of the mitral valve, sliding plasty, central Jenni stitch, and 33 mm Medtronic Simulus band annuloplasty.     ANESTHESIA:  General endotracheal.     ESTIMATED BLOOD LOSS:  100 mL.     BRIEF HISTORY:  Mr. Merino is a 55 year old gentleman who initially had trouble regulating his blood pressure.   His primary care physician referred him to a cardiologist to did an echo.  This study demonstrated severe mitral regurgitation.   He reported fatigue and decreased stamina.  Given the findings on echo, and his symptoms, he now presents for surgical correction.     PROCEDURE IN DETAIL:  After obtaining informed and written consent, the patient   was brought to the Operating Room and placed on the operating table in supine   position.  After induction of adequate general endotracheal anesthesia, the   patient's chest, abdomen, pelvis and bilateral lower extremities were prepped   and draped in the usual sterile fashion.  An upper midline skin incision was   made, and a median sternotomy was performed.  A pericardial well was created.    The patient was systemically heparinized.  Cannulation sutures were placed in   the aorta and in the superior vena cava.  The aortic cannula was inserted,   followed by the venous.  An IVC cannula was also placed.  Antegrade and   retrograde cardioplegia catheters were placed.  The patient was then put on   cardiopulmonary bypass.  Once on bypass, circumferential control was obtained   over the superior vena cava.  The aortic crossclamp was applied, and the heart   was arrested using cold blood enhanced antegrade cardioplegia.  A prompt   electromechanical arrest was achieved.  Once the cardioplegia was all in, the   cannulas were repositioned.  A left atriotomy was made near the right-sided   pulmonary veins.  The Joann retractor was placed for exposure.  The mitral   valve was evaluated.  The entire valve  demonstrated   myxomatous change, and was quite redundant.  It was a typical Friend valve.  Multiple nonpledgeted 2-0 Ethibond sutures were placed along   the posterior annulus from the medial trigone to the lateral trigone.The valve was then   tested, and a persistent jet at the commissure at A3 and P3 was seen.  A medial commissuroplasty was performed using a running 5-0 Ethibond in 2 layers.The annulus was   sized, and a 33 mm band was selected.  The band was brought up, the annuloplasty   sutures were passed through it, and it was slid into position.  It seated   nicely.  The sutures were tied and then cut.  The valve was again tested, and   again no mitral regurgitation was seen.  The left atriotomy was then closed in a   single layer using running 4-0 Prolene suture.  Prior to closing the left   atrium, de-airing maneuvers were performed.  Once the left atrium was closed,   the hot shot was given retrograde.  Additional de-airing maneuvers were   performed, and the aortic cross-clamp was removed.  The patient was subsequently   weaned from cardiopulmonary bypass.  The patient did separate easily from   bypass.    Once off bypass, the valve was evaluated using COLLETTE.  There was significant systolic anterior motion and left ventricular outflow tract obstruction.  This was clearly unacceptable.  The patient was placed back on cardiopulmonary bypass.  The aortic cross-clamp was applied, and the heart was arrested using cold blood enhance to antegrade cardioplegia.  A prompt electromechanical arrest was achieved.  The left atrial incision was reopened.  The retractor was placed, and the annuloplasty band was removed.  Multiple non pledgetted 2-0 Ethibond sutures were placed along the posterior annulus from the medial trigone to the lateral trigone.  I upsized the band significantly, and a 37 mm band was brought up.  The sutures were passed through it, and it was slid into position.  It seated nicely.  The sutures  were tied and then cut.  The valve was again tested, and no regurgitation was seen.  The left atriotomy was then closed in a single layer using running  4-0 Prolene.  Prior to closing the left atrium, de-airing maneuvers were performed.  The hotshot was given retrograde.  The aortic cross-clamp was removed.  The patient was subsequently weaned from cardiopulmonary bypass.  Once off bypass, the valve was evaluated using COLLETTE.  While much improved, there was still significant systolic anterior motion.  The patient was placed back on cardiopulmonary bypass.  The aortic cross-clamp was applied, and the heart was arrested using cold blood enhance to antegrade cardioplegia.  A prompt electromechanical arrest was achieved.  Once the cardioplegia was all in, the left atrium was again opened.  The retractor was placed.  The 37 mm band was removed.  Given the extreme redundancy of the leaflets, and the fact that the largest band we had available was a 39 mm band, I did not believe that up sizing would result in resolution of the systolic anterior motion.  The medial commissuroplasty suture was removed.  Multiple nonpledgeted 2-0 Ethibond sutures were placed along the posterior  Annulus from the medial trigone to the lateral trigone.  The P2 scallop of the posterior leaflet was disproportionately larger than the other scallops.  Silk sutures were placed around cords at either side of the P2 scallop.  The P2 scallop was then resected.  The P1 and P3 scallops were then partially detached from the annulus.  The sliding plasty was then performed using running 4-0 Prolene suture.   The leaflet was then reconstructed using a running 5-0 Ethibond.  The annulus was sized, and a 33 mm band was selected.  The band was brought up, the sutures were passed through it, and it was slid into position.  It seated nicely.  The sutures were tied and then cut.  The valve was again tested, and only central MR was seen.  Given the concern about  recurrent systolic anterior motion, and the presence of trivial central mitral regurgitation, I felt that placement of an Jenni stitch was indicated.  This was done using a 5 0 Ethibond.  The valve was again tested, and no mitral regurgitation was seen.  The left atrium was then closed in a single layer using running 4-0 Prolene.  Prior to closing the left atrium, de-airing maneuvers were performed.  Once the left atrium was closed, the hotshot was given retrograde.  Additional de-airing maneuvers were performed, and the aortic cross-clamp was removed.  The patient was subsequently weaned from cardiopulmonary bypass.  The patient did separate easily from bypass. Once off bypass, all surgical sites were inspected.  There was good   hemostasis.  The valve was evaluated using COLLETTE.  There was no residual mitral   regurgitation seen.   The coaptation point had successfully been moved posteriorly, and there was no longer any systolic anterior motion. The mean gradient through the valve was 2 mmHg.  The test   dose of protamine was administered, and this was well tolerated.  The total dose   was then given.  Hawley through the total dose, all cannulas were removed.    All surgical sites were again inspected, again there was good hemostasis.    Ventricular pacing wires were placed.  Drains were placed.  After again   confirming adequate hemostasis, the patient's chest was closed using eight #6   stainless steel wires to reapproximate the sternum.  The overlying soft tissues   were reapproximated using absorbable suture material.  The patient's chest was   washed and dried, and a dry dressing was applied.  The patient tolerated the   procedure well, there were no complications.  At the conclusion of the case,   sponge and instrument counts were correct.      Electronically signed by Cornel Guzman MD at 7/24/2020  8:04 PM        Transthoracic echo (TTE) complete  Order# [id number]  Reading physicians: Eden  MD Martine; Maikol Castanon MD Ordering physician: Sonny Olivier MD Study date: 7/28/20   Reason for Exam  Priority: STAT  Dx: Mitral valve regurgitation [I34.0 (ICD-10-CM)]   Comments: Status post mitral valve repair   Staff    Performing Sonographer   Christiano VERNA Kaceyjss    Vitals    Height Weight BMI (Calculated) BSA (Calculated - sq m) BP       130/72      Conclusion    · Normal left ventricular systolic function. The estimated ejection fraction is 65%.  · S/p mechanical valve repair with 33 mm Medtronic simulus annuloplasty band and Jenni stitch. Mean diastolic pressure gradient across MV is 5.9 mmHg at HR of 98 bpm. Trace MR seen.  · Indeterminate left ventricular diastolic function.  · Septal wall has abnormal motion consistent with post-operative status.  · Normal central venous pressure (3 mmHg).  · IVC appears small suggestive of hypovolumia      Study Details    A complete echo was performed using complete 2D, color flow Doppler and spectral Doppler. During the study, the apical, parasternal and subcostal views were captured.  Overall the study quality was adequate. This was a portable study performed at the patient's bedside.   Echocardiography Findings    Left Ventricle Normal ejection fraction at 65%.  Normal left ventricular cavity size. Normal wall thickness observed. Abnormal septal motion consistent with post-operative state. Indeterminate left ventricular diastolic function.   Right Ventricle Right ventricle not well visualized. Normal cavity size.   Left Atrium The left atrial volume index is normal. Left atrial volume index is 20.0 mL/m2.   Right Atrium There is normal right atrial size.   Aortic Valve The aortic valve appears structurally normal. The valve is trileaflet. There is mild annular calcification of the aortic valve present. There is normal leaflet mobility.   Mitral Valve The following structural abnormalities were observed: repaired mitral valve with annuloplasty ring. There  is normal leaflet mobility. Trace regurgitation.   Tricuspid Valve The tricuspid valve appears structurally normal. Trace regurgitation. There is normal leaflet mobility.   Pulmonic Valve The pulmonic valve was not well visualized. Trace regurgitation.   IVC/SVC Normal central venous pressure (3 mm Hg).   Ascending Aorta The aortic root and ascending aorta are normal in size.   Pericardium No pericardial effusion. Pericardium is normal.   2D Measurements    Dimensions   LVIDD 5.47 cm      LVIDS 2.83 cm      IVS 0.57 cm      PW 0.85 cm      FS 48 %      LA size 3.45 cm      LA volume 40.9 cm3      LA Volume Index 20 mL/m2      LV mass 136.56 g       Dimensions   RVDD 2.72 cm      RA Width 2.55 cm      RA Major Axis 3.79 cm       Aortic Root - End Diastolic   Sinus 3.2 cm      STJ 3.43 cm      Ascending aorta 3.47 cm           Doppler Measurements - Diastolic Filling    Diastolic Filling   E/A ratio 0.78       Mean e' 0.08 m/s       E wave decelartion time 393.92 msec      E/E' ratio 15.75 m/s         Doppler Measurements - Aortic Valve    Stenosis   LVOT diameter 2.22 cm      LVOT area 3.9 cm2      LVOT peak mj 0.94 m/s      LVOT peak VTI 17.39 cm      Ao peak mj 0.95 m/s      Ao VTI 13.89 cm      AV valve area 4.84 cm2      AV mean gradient 3 mmHg      AV peak gradient 4 mmHg      AV Velocity Ratio 0.99       AV index (prosthetic) 1.25             Doppler Measurements - Mitral Valve    Stenosis   MV valve area p 1/2 method 1.93 cm2       PISA-MS   MV Peak E Mj 1.26 m/s            Doppler Measurements - Shunt Ratio    Shunt Ratio   LVOT stroke volume 67.28 cm3          Wall Scoring    Resting Score Index: 1.00              The left ventricular wall motion is normal.               Result Image Hyperlink     Show images for Echo Color Flow Doppler? Yes       Medications  (Filter: Administrations occurring from 07/28/20 1030 to 07/28/20 1100)  None   Signed    Electronically signed by Maikol Castanon MD on  7/28/20 at 1221 CDT   Link to Order Report    Order Report   Previous Versions    Report on 7/28/20 1208 CDT by Eden Farley MD   External Results Report    Result Report for external users   Medication List at Discharge       aspirin 325 mg Oral Daily     docusate sodium 100 mg Oral 2 times daily PRN     furosemide 20 MG Take one tablet by mouth twice daily for 7 days then decrease to once daily     metoprolol tartrate 25 mg Oral 2 times daily     multivit with minerals/lutein   See Instructions, Take 1 po Daily, 0 Refill(s)     omega-3/dha/epa/dpa/fish oil   1 cap, Oral, Daily, # 100 cap, 0 Refill(s)     oxycodone HCl 5 mg Oral Every 4 hours PRN     pantoprazole sodium 40 mg Oral Daily     potassium chloride 20 MEQ 20 mEq Oral 2 times daily  Medications Administered    Medication Calculated Total   No medications were administered.

## 2020-08-26 RX ORDER — ASPIRIN 325 MG
325 TABLET ORAL DAILY
Qty: 30 TABLET | Refills: 11 | Status: SHIPPED | OUTPATIENT
Start: 2020-08-26 | End: 2021-08-26

## 2020-10-09 ENCOUNTER — PATIENT MESSAGE (OUTPATIENT)
Dept: CARDIOTHORACIC SURGERY | Facility: CLINIC | Age: 55
End: 2020-10-09

## 2020-11-30 ENCOUNTER — PATIENT MESSAGE (OUTPATIENT)
Dept: CARDIOTHORACIC SURGERY | Facility: CLINIC | Age: 55
End: 2020-11-30

## (undated) DEVICE — FOGERTY SOFT JAW DISP 2/PK

## (undated) DEVICE — TAPE SURG MEDIPORE 6X72IN

## (undated) DEVICE — SUT PROLENE 3-0 SH DA 36 BL

## (undated) DEVICE — SUT PROLENE 4-0 SH BLU 36IN

## (undated) DEVICE — KIT URINARY CATH URINE METER

## (undated) DEVICE — CANNULA MULTIPLE PERFUSIONSET

## (undated) DEVICE — SUT ETHIBOND XTRA 2-0 SH-2

## (undated) DEVICE — SUT 2/0 30IN ETHIBOND

## (undated) DEVICE — SUT PROLENE 5-0 24 C-1 BL

## (undated) DEVICE — SUT SILK 2-0 SH 18IN BLACK

## (undated) DEVICE — GAUZE SPONGE 4X4 12PLY

## (undated) DEVICE — LOOP VESSEL BLUE MAXI

## (undated) DEVICE — WIPE ESENTA BARR STNG FREE 3ML

## (undated) DEVICE — IMPLANTABLE DEVICE
Type: IMPLANTABLE DEVICE | Site: HEART | Status: NON-FUNCTIONAL
Removed: 2020-07-24

## (undated) DEVICE — CONTAINER SPECIMEN STRL 4OZ

## (undated) DEVICE — HEMOSTAT SURGICEL 4X8IN

## (undated) DEVICE — SEE MEDLINE ITEM 152487

## (undated) DEVICE — GAUZE SPONGE 4'X4 12 PLY

## (undated) DEVICE — SUT PROLENE 4-0 RB-1 BL MO

## (undated) DEVICE — Device

## (undated) DEVICE — TRAY HEART

## (undated) DEVICE — ELECTRODE REM PLYHSV RETURN 9

## (undated) DEVICE — BLADE SAW STERNAL 5/BX

## (undated) DEVICE — DRESSING ADH ISLAND 3.6 X 14

## (undated) DEVICE — DRAPE SPLIT STERILE

## (undated) DEVICE — GLOVE BIOGEL PI MICRO SZ 7.5

## (undated) DEVICE — NDL 22GA X1 1/2 REG BEVEL

## (undated) DEVICE — KIT SAHARA DRAPE DRAW/LIFT

## (undated) DEVICE — DRAPE SLUSH WARMER WITH DISC

## (undated) DEVICE — SEE MEDLINE ITEM 146417

## (undated) DEVICE — DRESSING AQUACEL SACRAL 9 X 9

## (undated) DEVICE — INSERTS STEALTH FIBRA SIZE 5

## (undated) DEVICE — SYR 30CC LUER LOCK

## (undated) DEVICE — SUT 2-0 VICRYL / CT-1

## (undated) DEVICE — SUT VICRYL BR 1 GEN 27 CT-1

## (undated) DEVICE — DRAPE INCISE IOBAN 2 23X17IN

## (undated) DEVICE — CANNULA RETROGRADE CARDIOPLEG

## (undated) DEVICE — SOL 9P NACL IRR PIC IL

## (undated) DEVICE — SUT 6 18IN STEEL MONO CCS

## (undated) DEVICE — CLOSURE SKIN STERI STRIP 1/2X4

## (undated) DEVICE — DRAIN CHANNEL ROUND 19FR

## (undated) DEVICE — SUT VICRYL PLUS 3-0 FS1 27

## (undated) DEVICE — DRAIN CHEST DRY SUCTION

## (undated) DEVICE — BELLOW CANN HEMOBLAST 1.65GR

## (undated) DEVICE — SUT SILK BLK BR. 2 2-60